# Patient Record
Sex: FEMALE | Race: WHITE | NOT HISPANIC OR LATINO | Employment: FULL TIME | URBAN - METROPOLITAN AREA
[De-identification: names, ages, dates, MRNs, and addresses within clinical notes are randomized per-mention and may not be internally consistent; named-entity substitution may affect disease eponyms.]

---

## 2017-11-01 ENCOUNTER — OFFICE VISIT (OUTPATIENT)
Dept: INTERNAL MEDICINE | Facility: MEDICAL CENTER | Age: 27
End: 2017-11-01
Payer: MEDICAID

## 2017-11-01 ENCOUNTER — HOSPITAL ENCOUNTER (OUTPATIENT)
Dept: LAB | Facility: MEDICAL CENTER | Age: 27
End: 2017-11-01
Attending: INTERNAL MEDICINE
Payer: MEDICAID

## 2017-11-01 VITALS
SYSTOLIC BLOOD PRESSURE: 144 MMHG | HEIGHT: 67 IN | RESPIRATION RATE: 12 BRPM | DIASTOLIC BLOOD PRESSURE: 82 MMHG | BODY MASS INDEX: 23.7 KG/M2 | HEART RATE: 56 BPM | OXYGEN SATURATION: 96 % | WEIGHT: 151 LBS | TEMPERATURE: 99.1 F

## 2017-11-01 DIAGNOSIS — E16.2 HYPOGLYCEMIA WITHOUT DIAGNOSIS OF DIABETES MELLITUS: ICD-10-CM

## 2017-11-01 LAB
ALBUMIN SERPL BCP-MCNC: 4.2 G/DL (ref 3.2–4.9)
ALBUMIN/GLOB SERPL: 1.3 G/DL
ALP SERPL-CCNC: 51 U/L (ref 30–99)
ALT SERPL-CCNC: 23 U/L (ref 2–50)
ANION GAP SERPL CALC-SCNC: 9 MMOL/L (ref 0–11.9)
AST SERPL-CCNC: 21 U/L (ref 12–45)
BASOPHILS # BLD AUTO: 0.5 % (ref 0–1.8)
BASOPHILS # BLD: 0.05 K/UL (ref 0–0.12)
BILIRUB SERPL-MCNC: 0.4 MG/DL (ref 0.1–1.5)
BUN SERPL-MCNC: 10 MG/DL (ref 8–22)
CALCIUM SERPL-MCNC: 9.7 MG/DL (ref 8.5–10.5)
CHLORIDE SERPL-SCNC: 106 MMOL/L (ref 96–112)
CO2 SERPL-SCNC: 23 MMOL/L (ref 20–33)
CREAT SERPL-MCNC: 0.79 MG/DL (ref 0.5–1.4)
EOSINOPHIL # BLD AUTO: 0.06 K/UL (ref 0–0.51)
EOSINOPHIL NFR BLD: 0.6 % (ref 0–6.9)
ERYTHROCYTE [DISTWIDTH] IN BLOOD BY AUTOMATED COUNT: 41.8 FL (ref 35.9–50)
EST. AVERAGE GLUCOSE BLD GHB EST-MCNC: 100 MG/DL
GFR SERPL CREATININE-BSD FRML MDRD: >60 ML/MIN/1.73 M 2
GLOBULIN SER CALC-MCNC: 3.3 G/DL (ref 1.9–3.5)
GLUCOSE 1H P CHAL SERPL-MCNC: 113 MG/DL (ref 65–199)
GLUCOSE 2H P CHAL SERPL-MCNC: 88 MG/DL (ref 65–139)
GLUCOSE 3H P CHAL SERPL-MCNC: 73 MG/DL (ref 65–109)
GLUCOSE BS SERPL-MCNC: 73 MG/DL (ref 65–99)
GLUCOSE SERPL-MCNC: 77 MG/DL (ref 65–99)
HBA1C MFR BLD: 5.1 % (ref 0–5.6)
HCT VFR BLD AUTO: 42.2 % (ref 37–47)
HGB BLD-MCNC: 14.5 G/DL (ref 12–16)
IMM GRANULOCYTES # BLD AUTO: 0.04 K/UL (ref 0–0.11)
IMM GRANULOCYTES NFR BLD AUTO: 0.4 % (ref 0–0.9)
LYMPHOCYTES # BLD AUTO: 2.28 K/UL (ref 1–4.8)
LYMPHOCYTES NFR BLD: 22.8 % (ref 22–41)
MCH RBC QN AUTO: 31.6 PG (ref 27–33)
MCHC RBC AUTO-ENTMCNC: 34.4 G/DL (ref 33.6–35)
MCV RBC AUTO: 91.9 FL (ref 81.4–97.8)
MONOCYTES # BLD AUTO: 0.57 K/UL (ref 0–0.85)
MONOCYTES NFR BLD AUTO: 5.7 % (ref 0–13.4)
NEUTROPHILS # BLD AUTO: 6.98 K/UL (ref 2–7.15)
NEUTROPHILS NFR BLD: 70 % (ref 44–72)
NRBC # BLD AUTO: 0 K/UL
NRBC BLD AUTO-RTO: 0 /100 WBC
PLATELET # BLD AUTO: 357 K/UL (ref 164–446)
PMV BLD AUTO: 9.4 FL (ref 9–12.9)
POTASSIUM SERPL-SCNC: 3.6 MMOL/L (ref 3.6–5.5)
PROT SERPL-MCNC: 7.5 G/DL (ref 6–8.2)
RBC # BLD AUTO: 4.59 M/UL (ref 4.2–5.4)
SODIUM SERPL-SCNC: 138 MMOL/L (ref 135–145)
TSH SERPL DL<=0.005 MIU/L-ACNC: 2.5 UIU/ML (ref 0.3–3.7)
WBC # BLD AUTO: 10 K/UL (ref 4.8–10.8)

## 2017-11-01 PROCEDURE — 82952 GTT-ADDED SAMPLES: CPT

## 2017-11-01 PROCEDURE — 82951 GLUCOSE TOLERANCE TEST (GTT): CPT

## 2017-11-01 PROCEDURE — 80053 COMPREHEN METABOLIC PANEL: CPT

## 2017-11-01 PROCEDURE — 99204 OFFICE O/P NEW MOD 45 MIN: CPT | Mod: GC | Performed by: INTERNAL MEDICINE

## 2017-11-01 PROCEDURE — 85025 COMPLETE CBC W/AUTO DIFF WBC: CPT

## 2017-11-01 PROCEDURE — 83036 HEMOGLOBIN GLYCOSYLATED A1C: CPT

## 2017-11-01 PROCEDURE — 36415 COLL VENOUS BLD VENIPUNCTURE: CPT

## 2017-11-01 PROCEDURE — 84443 ASSAY THYROID STIM HORMONE: CPT

## 2017-11-01 ASSESSMENT — PAIN SCALES - GENERAL: PAINLEVEL: NO PAIN

## 2017-11-01 NOTE — PROGRESS NOTES
New Patient to Establish    Reason to establish: New patient to establish    CC: Dizziness, weakness after eating x 1 year     HPI: Ms Deshpande is a 25 yo female with no significant past medical history presents today to establish a new patient and above complain. She never had a primary care doctor before. She started having these symptoms one year ago. After 1-2 hours eating breakfast she would start feeling nauseous, feels hungry, dizzy, about to pass out, and shaky, she would sit down and eat something and the symptoms go away. This happens again after eating lunch. After dinner around 7 pm she usually lies down in bed and does not experience any symptoms. Never woke up at night with the  symptoms.   It happens every day and getting worse since last 3-4 months. No previous history of diabetes, thyroid disorders. Not on any medications, OTC supplements except birth control pills. Family history of DM in mother and all 4 grandparents. Mother had ovarian cancer, no other cancers in family. Non smoker, very occasional alcohol use, no drug use.     Patient Active Problem List    Diagnosis Date Noted   • Hypoglycemia without diagnosis of diabetes mellitus 11/01/2017       Past Medical History:   Diagnosis Date   • Headache, classical migraine 2006       No current outpatient prescriptions on file.     No current facility-administered medications for this visit.        Allergies as of 11/01/2017   • (No Known Allergies)       Social History     Social History   • Marital status: Single     Spouse name: N/A   • Number of children: N/A   • Years of education: N/A     Occupational History   • Not on file.     Social History Main Topics   • Smoking status: Never Smoker   • Smokeless tobacco: Never Used   • Alcohol use No      Comment: very occasional    • Drug use: No   • Sexual activity: Yes     Birth control/ protection: Pill     Other Topics Concern   • Not on file     Social History Narrative   • No narrative on file  "      Family History   Problem Relation Age of Onset   • Cancer Mother 30     ovarian    • Diabetes Mother    • Diabetes Maternal Grandfather    • Diabetes Paternal Grandmother    • Diabetes Maternal Grandmother    • Diabetes Paternal Grandfather        History reviewed. No pertinent surgical history.    ROS: As per HPI. Additional pertinent symptoms as noted below.    Constitutional: no gain or loss of weight  Eyes: no vision changes  ENT: no neck lumps, difficulty swallowing  Cardiovascular: no chest pain palpitations  Respiratory: no sob  GI: no loss of appetite  : no urinary symptoms   Musculo-skeletal: no joint pains, has a fine tremor all the time.   Skin: no skin rashes  Neurological: no headaches, loss of consciousness  Psychological: no depression     /82   Pulse (!) 56   Temp 37.3 °C (99.1 °F)   Resp 12   Ht 1.702 m (5' 7\")   Wt 68.5 kg (151 lb)   SpO2 96%   Breastfeeding? No   BMI 23.65 kg/m²     Physical Exam  General:  Alert and oriented, No apparent distress.    Eyes: Pupils equal and reactive. No scleral icterus.    Throat: Clear no erythema or exudates noted.    Neck: Supple. No lymphadenopathy noted. Thyroid not enlarged.    Lungs: Clear to auscultation and percussion bilaterally.    Cardiovascular: Regular rate and rhythm. No murmurs, rubs or gallops.    Abdomen:  Benign. No rebound or guarding noted.    Extremities: No clubbing, cyanosis, edema, very fine tremor of both hands.     Skin: Clear. No rash or suspicious skin lesions noted.    Note: I have reviewed all pertinent labs and diagnostic tests associated with this visit with specific comments listed under the assessment and plan below    Assessment and Plan    1. Hypoglycemia without diagnosis of diabetes mellitus  Her symptoms are consistent with hypoglycemia. Need to establish low blood sugar at the time of symptoms. Will order fasting blood sugar, blood sugar after one, two and three hours after a meal. Patient is fasting " currently and will go to the lab after this appointment.   Will also order hBA1C, TSH with T4, CBC, CMP and AM cortisol to screen for Yeison's disease.   If hypoglycemia is established with lab testing, will need to do post prandial mixed meal testing for blood glucose, insulin, c peptide, BHOB, proinsulin and sulfonylurea and meglitinide screen.         Followup: Return in about 5 weeks (around 12/6/2017). for follow up      Signed by: Chasidy Palacios M.D.

## 2017-11-03 DIAGNOSIS — E16.2 HYPOGLYCEMIA WITHOUT DIAGNOSIS OF DIABETES MELLITUS: ICD-10-CM

## 2017-11-11 ENCOUNTER — HOSPITAL ENCOUNTER (OUTPATIENT)
Dept: LAB | Facility: MEDICAL CENTER | Age: 27
End: 2017-11-11
Attending: INTERNAL MEDICINE
Payer: MEDICAID

## 2017-11-11 LAB — CORTIS SERPL-MCNC: 22.1 UG/DL (ref 0–23)

## 2017-11-11 PROCEDURE — 36415 COLL VENOUS BLD VENIPUNCTURE: CPT

## 2017-11-11 PROCEDURE — 82533 TOTAL CORTISOL: CPT

## 2017-12-06 ENCOUNTER — OFFICE VISIT (OUTPATIENT)
Dept: INTERNAL MEDICINE | Facility: MEDICAL CENTER | Age: 27
End: 2017-12-06
Payer: MEDICAID

## 2017-12-06 ENCOUNTER — PATIENT MESSAGE (OUTPATIENT)
Dept: INTERNAL MEDICINE | Facility: MEDICAL CENTER | Age: 27
End: 2017-12-06

## 2017-12-06 VITALS
OXYGEN SATURATION: 94 % | TEMPERATURE: 98.1 F | RESPIRATION RATE: 16 BRPM | WEIGHT: 157 LBS | BODY MASS INDEX: 24.64 KG/M2 | DIASTOLIC BLOOD PRESSURE: 70 MMHG | HEART RATE: 82 BPM | SYSTOLIC BLOOD PRESSURE: 132 MMHG | HEIGHT: 67 IN

## 2017-12-06 DIAGNOSIS — Z00.00 HEALTH CARE MAINTENANCE: ICD-10-CM

## 2017-12-06 DIAGNOSIS — R10.13 EPIGASTRIC PAIN: ICD-10-CM

## 2017-12-06 PROCEDURE — 99213 OFFICE O/P EST LOW 20 MIN: CPT | Mod: GE | Performed by: INTERNAL MEDICINE

## 2017-12-06 RX ORDER — OMEPRAZOLE 20 MG/1
20 CAPSULE, DELAYED RELEASE ORAL DAILY
Qty: 45 CAP | Refills: 0 | Status: SHIPPED | OUTPATIENT
Start: 2017-12-06 | End: 2017-12-11

## 2017-12-06 ASSESSMENT — PATIENT HEALTH QUESTIONNAIRE - PHQ9: CLINICAL INTERPRETATION OF PHQ2 SCORE: 0

## 2017-12-06 NOTE — PROGRESS NOTES
"      Established Patient    Luba presents today with the following:    CC: follow up for epigastric pain, dizziness after eating     HPI: 27 yo female here for follow up. During first visit she complained of  feeling nauseous, feels hungry, dizzy, about to pass out, and shaky, she would sit down and eat something and the symptoms go away. This happens after eating breakfast and lunch. After dinner around 7 pm she usually lies down in bed and does not experience any symptoms. Never woke up at night with the  symptoms.   It happens every day and getting worse since last 3-4 months. Today she states that she also has burning sensation of the stomach also. Never had reflux, night time cough, metalic taste in mouth. She also noted 6 lb weight gain over 5 weeks.     Patient Active Problem List    Diagnosis Date Noted   • Health care maintenance 12/06/2017   • Epigastric pain 11/01/2017       Current Outpatient Prescriptions   Medication Sig Dispense Refill   • omeprazole (PRILOSEC) 20 MG delayed-release capsule Take 1 Cap by mouth every day. 45 Cap 0     No current facility-administered medications for this visit.        ROS: As per HPI. Additional pertinent symptoms as noted below.      /70   Pulse 82   Temp 36.7 °C (98.1 °F)   Resp 16   Ht 1.702 m (5' 7\")   Wt 71.2 kg (157 lb)   SpO2 94%   BMI 24.59 kg/m²     Physical Exam   Constitutional:  oriented to person, place, and time. No distress.   Eyes: Pupils are equal, round, and reactive to light. No scleral icterus.  Neck: Neck supple. No thyromegaly present.   Cardiovascular: Normal rate, regular rhythm and normal heart sounds.  Exam reveals no gallop and no friction rub.  No murmur heard.  Pulmonary/Chest: Breath sounds normal. Chest wall is not dull to percussion.   Musculoskeletal:   no edema.   Lymphadenopathy: no cervical adenopathy  Neurological: alert and oriented to person, place, and time.   Skin: No cyanosis. Nails show no clubbing.      Note: " I have reviewed all pertinent labs and diagnostic tests associated with this visit with specific comments listed under the assessment and plan below    Assessment and Plan    1. Epigastric pain  Given her symptoms of postprandial, weakness, dizziness and shakiness, clinically suspected post prandial hypoglycemia but lab work was negative. Today she describes she has burning sensation also. This can be related to peptic ulcer/GERD. Will do a trial of omeprazole for 4-6 weeks and see the response. Discussed about lifestyle modifications ex: avoid caffeine, spicy food,manage stress    2. Health care maintenance  Patient will schedule pap smear with OBGYN. She declines flu shot    3. Concerns for high blood pressure   She took her BP at grocery store few times and there were 3-4 times BP was 140s/90s. Recommended to keep a regular blood pressure log. Discussed about lifestyle modifications.       Followup: Return in about 5 weeks (around 1/10/2018) for Long.      Signed by: Chasidy Palacios M.D.

## 2017-12-07 NOTE — TELEPHONE ENCOUNTER
From: Luba Deshpande  To: Chasidy Palacios M.D.  Sent: 12/6/2017 5:07 PM PST  Subject: Prescription Question    The pharmacy is saying I need to get a prior authorization so that I can  my prescription?

## 2017-12-11 ENCOUNTER — TELEPHONE (OUTPATIENT)
Dept: INTERNAL MEDICINE | Facility: MEDICAL CENTER | Age: 27
End: 2017-12-11

## 2017-12-11 NOTE — TELEPHONE ENCOUNTER
Omeprazole is not covered on insurance, please change to BRAND THE[facility]A Nexium if you're comfortable, it's what's covered.

## 2018-01-08 ENCOUNTER — OFFICE VISIT (OUTPATIENT)
Dept: INTERNAL MEDICINE | Facility: MEDICAL CENTER | Age: 28
End: 2018-01-08
Payer: MEDICAID

## 2018-01-08 VITALS
SYSTOLIC BLOOD PRESSURE: 128 MMHG | HEIGHT: 67 IN | BODY MASS INDEX: 25.74 KG/M2 | DIASTOLIC BLOOD PRESSURE: 74 MMHG | OXYGEN SATURATION: 96 % | WEIGHT: 164 LBS | TEMPERATURE: 98.1 F | HEART RATE: 98 BPM | RESPIRATION RATE: 20 BRPM

## 2018-01-08 DIAGNOSIS — R10.13 EPIGASTRIC PAIN: ICD-10-CM

## 2018-01-08 DIAGNOSIS — Z00.00 HEALTH CARE MAINTENANCE: ICD-10-CM

## 2018-01-08 PROCEDURE — 99213 OFFICE O/P EST LOW 20 MIN: CPT | Mod: GC | Performed by: INTERNAL MEDICINE

## 2018-01-08 RX ORDER — DROSPIRENONE AND ETHINYL ESTRADIOL 0.03MG-3MG
1 KIT ORAL DAILY
COMMUNITY
Start: 2018-01-02

## 2018-01-08 ASSESSMENT — PAIN SCALES - GENERAL: PAINLEVEL: NO PAIN

## 2018-01-08 NOTE — PROGRESS NOTES
"      Established Patient    Luba presents today with the following:    CC: follow up epigastric pain     HPI: 26 yo female presents today for follow up of epigastric  Pain. She was started on nexium 20 mg QD which helped the burning sensation but continues to have symptoms towards afternoon.worst pain in 8/10.  She has tried to cut down portion size of meals and tried to exercise more but she has gained 13 lb last 2 months. She still consumes one energy drink a day and eats chocolate. No NSAID use. Denies melena, [place] bleeding.   Noted to have high blood pressure on 2 occasions, did not measure at home. Normal BP today.     Patient Active Problem List    Diagnosis Date Noted   • Health care maintenance 12/06/2017   • Epigastric pain 11/01/2017       Current Outpatient Prescriptions   Medication Sig Dispense Refill   • esomeprazole (NEXIUM) 20 MG capsule Take 1 Cap by mouth 2 times daily, before breakfast and dinner. 60 Cap 2   • drospirenone-ethinyl estradiol (ZEUS) 3-0.03 MG per tablet Take 1 Tab by mouth every day.       No current facility-administered medications for this visit.        ROS: As per HPI. Additional pertinent symptoms as noted below.        /74   Pulse 98   Temp 36.7 °C (98.1 °F)   Resp 20   Ht 1.702 m (5' 7\")   Wt 74.4 kg (164 lb)   SpO2 96%   Breastfeeding? No   BMI 25.69 kg/m²     Physical Exam   Constitutional:  oriented to person, place, and time. No distress.   Eyes: Pupils are equal, round, and reactive to light. No scleral icterus.  Neck: Neck supple. No thyromegaly present.   Cardiovascular: Normal rate, regular rhythm and normal heart sounds.  Exam reveals no gallop and no friction rub.  No murmur heard.  Pulmonary/Chest: Breath sounds normal. Chest wall is not dull to percussion.   No epigastric tenderness.  Musculoskeletal:   no edema.   Lymphadenopathy: no cervical adenopathy  Neurological: alert and oriented to person, place, and time.   Skin: No cyanosis. Nails show " no clubbing.      Note: I have reviewed all pertinent labs and diagnostic tests associated with this visit with specific comments listed under the assessment and plan below    Assessment and Plan    1. Epigastric pain  Her symptoms improve with meals, responded to PPI, gained 13 lb last 2 months. Possible duodenal ulcer. Will increase nexium to 20 mg twice a day. Consider to add carafet if no improvement.   Referral sent to GI.   Discussed about healthy diet options, avoiding caffeine, chocolate. She is already a non smoker, no alcohol use.     2. Health care maintenance  Pap - done by OBGYN  Flu - deferred       Followup: Return in about 10 weeks (around 3/19/2018) for Long.      Signed by: Chasidy Palacios M.D.

## 2018-01-08 NOTE — PATIENT INSTRUCTIONS
Increase nexium to twice a day  A referral sent to gastroenterology    Food Choices for Gastroesophageal Reflux Disease, Adult  When you have gastroesophageal reflux disease (GERD), the foods you eat and your eating habits are very important. Choosing the right foods can help ease the discomfort of GERD.  WHAT GENERAL GUIDELINES DO I NEED TO FOLLOW?  · Choose fruits, vegetables, whole grains, low-fat dairy products, and low-fat meat, fish, and poultry.  · Limit fats such as oils, salad dressings, butter, nuts, and avocado.  · Keep a food diary to identify foods that cause symptoms.  · Avoid foods that cause reflux. These may be different for different people.  · Eat frequent small meals instead of three large meals each day.  · Eat your meals slowly, in a relaxed setting.  · Limit fried foods.  · Cook foods using methods other than frying.  · Avoid drinking alcohol.  · Avoid drinking large amounts of liquids with your meals.  · Avoid bending over or lying down until 2-3 hours after eating.  WHAT FOODS ARE NOT RECOMMENDED?  The following are some foods and drinks that may worsen your symptoms:  Vegetables  Tomatoes. Tomato juice. Tomato and spaghetti sauce. Chili peppers. Onion and garlic. Horseradish.  Fruits  Oranges, grapefruit, and lemon (fruit and juice).  Meats  High-fat meats, fish, and poultry. This includes hot dogs, ribs, ham, sausage, salami, and malik.  Dairy  Whole milk and chocolate milk. Sour cream. Cream. Butter. Ice cream. Cream cheese.   Beverages  Coffee and tea, with or without caffeine. Carbonated beverages or energy drinks.  Condiments  Hot sauce. Barbecue sauce.   Sweets/Desserts  Chocolate and cocoa. Donuts. Peppermint and spearmint.  Fats and Oils  High-fat foods, including French fries and potato chips.  Other  Vinegar. Strong spices, such as black pepper, white pepper, red pepper, cayenne, arthur powder, cloves, ginger, and chili powder.  The items listed above may not be a complete list of  foods and beverages to avoid. Contact your dietitian for more information.     This information is not intended to replace advice given to you by your health care provider. Make sure you discuss any questions you have with your health care provider.     Document Released: 12/18/2006 Document Revised: 01/08/2016 Document Reviewed: 10/22/2014  [employer] Interactive Patient Education ©2016 [employer]

## 2018-02-12 ENCOUNTER — TELEPHONE (OUTPATIENT)
Dept: INTERNAL MEDICINE | Facility: MEDICAL CENTER | Age: 28
End: 2018-02-12

## 2018-03-07 DIAGNOSIS — K62.5 PR (BLEEDING PER RECTUM): ICD-10-CM

## 2018-03-19 ENCOUNTER — APPOINTMENT (OUTPATIENT)
Dept: INTERNAL MEDICINE | Facility: MEDICAL CENTER | Age: 28
End: 2018-03-19
Payer: MEDICAID

## 2018-03-21 ENCOUNTER — OFFICE VISIT (OUTPATIENT)
Dept: INTERNAL MEDICINE | Facility: MEDICAL CENTER | Age: 28
End: 2018-03-21
Payer: MEDICAID

## 2018-03-21 VITALS
WEIGHT: 153 LBS | TEMPERATURE: 98.7 F | HEIGHT: 67 IN | HEART RATE: 69 BPM | DIASTOLIC BLOOD PRESSURE: 78 MMHG | BODY MASS INDEX: 24.01 KG/M2 | SYSTOLIC BLOOD PRESSURE: 98 MMHG | OXYGEN SATURATION: 98 %

## 2018-03-21 DIAGNOSIS — K62.5 PR (BLEEDING PER RECTUM): ICD-10-CM

## 2018-03-21 DIAGNOSIS — R10.13 EPIGASTRIC PAIN: ICD-10-CM

## 2018-03-21 PROCEDURE — 99214 OFFICE O/P EST MOD 30 MIN: CPT | Mod: GC | Performed by: INTERNAL MEDICINE

## 2018-03-21 NOTE — PROGRESS NOTES
"      Established Patient    Luba presents today with the following:    CC: Epigastric pain     HPI: Luba presents today for follow up. Her epigastric pain is somewhat controlled with Nexium. Compliant with medications and dietary modifications. She had been more active and lost some weight intentionally. She recently had one episode of bleeding MT, evaluated in ED. Denies any more episodes of MT bleeding, constipation, diarrhea, abdominal pain, melena, hematemesis.   She is waiting for an appointment with GI.     Patient Active Problem List    Diagnosis Date Noted   • MT (bleeding per rectum) 03/21/2018   • Health care maintenance 12/06/2017   • Epigastric pain 11/01/2017       Current Outpatient Prescriptions   Medication Sig Dispense Refill   • esomeprazole (NEXIUM) 20 MG capsule Take 1 Cap by mouth every morning before breakfast. 30 Cap 1   • drospirenone-ethinyl estradiol (ZEUS) 3-0.03 MG per tablet Take 1 Tab by mouth every day.       No current facility-administered medications for this visit.        ROS: As per HPI. Additional pertinent symptoms as noted below.        BP (!) 98/78   Pulse 69   Temp 37.1 °C (98.7 °F)   Ht 1.702 m (5' 7\")   Wt 69.4 kg (153 lb)   LMP 03/01/2018   SpO2 98%   BMI 23.96 kg/m²     Physical Exam   Constitutional:  oriented to person, place, and time. No distress.   Eyes: Pupils are equal, round, and reactive to light. No scleral icterus.  Neck: Neck supple. No thyromegaly present.   Cardiovascular: Normal rate, regular rhythm and normal heart sounds.  Exam reveals no gallop and no friction rub.  No murmur heard.  Pulmonary/Chest: Breath sounds normal. Chest wall is not dull to percussion.   Musculoskeletal:   no edema.   Lymphadenopathy: no cervical adenopathy  Neurological: alert and oriented to person, place, and time.   Skin: No cyanosis. Nails show no clubbing.      Note: I have reviewed all pertinent labs and diagnostic tests associated with this visit with " specific comments listed under the assessment and plan below    Assessment and Plan    1. Epigastric pain  Improved symptoms with nexium.   Continue PPI until seen by GI.    2. CT (bleeding per rectum)  One episode, no recurrences. No family history of colon cancer.   No constipation. Discussed dietary changes to avoid constipation.   Awaiting GI evaluation.       Followup: Return in about 6 months (around 9/21/2018) for Long.      Signed by: Chasidy Palacios M.D.

## 2019-05-07 ENCOUNTER — APPOINTMENT (OUTPATIENT)
Dept: RADIOLOGY | Facility: IMAGING CENTER | Age: 29
End: 2019-05-07
Attending: PHYSICIAN ASSISTANT

## 2019-05-07 ENCOUNTER — OFFICE VISIT (OUTPATIENT)
Dept: URGENT CARE | Facility: CLINIC | Age: 29
End: 2019-05-07

## 2019-05-07 VITALS
OXYGEN SATURATION: 97 % | HEIGHT: 67 IN | WEIGHT: 153 LBS | RESPIRATION RATE: 13 BRPM | SYSTOLIC BLOOD PRESSURE: 114 MMHG | BODY MASS INDEX: 24.01 KG/M2 | TEMPERATURE: 97.7 F | DIASTOLIC BLOOD PRESSURE: 76 MMHG | HEART RATE: 62 BPM

## 2019-05-07 DIAGNOSIS — M79.672 LEFT FOOT PAIN: ICD-10-CM

## 2019-05-07 PROCEDURE — 99213 OFFICE O/P EST LOW 20 MIN: CPT | Performed by: PHYSICIAN ASSISTANT

## 2019-05-07 PROCEDURE — 73610 X-RAY EXAM OF ANKLE: CPT | Mod: TC,LT | Performed by: PHYSICIAN ASSISTANT

## 2019-05-07 PROCEDURE — 73630 X-RAY EXAM OF FOOT: CPT | Mod: TC,LT | Performed by: PHYSICIAN ASSISTANT

## 2019-05-07 ASSESSMENT — ENCOUNTER SYMPTOMS
SENSORY CHANGE: 0
TINGLING: 1
FOCAL WEAKNESS: 0

## 2019-05-08 NOTE — PROGRESS NOTES
"  Subjective:   Luba Deshpande is a 28 y.o. female who presents today with   Chief Complaint   Patient presents with   • Foot Pain       Foot Problem   This is a new problem. The current episode started in the past 7 days. The problem occurs constantly. The problem has been gradually worsening. The symptoms are aggravated by standing, twisting and walking. She has tried acetaminophen and ice for the symptoms. The treatment provided mild relief.   Patient states she was doing roller Tram several days ago and she fell hurting her left ankle and foot.    PMH:  has a past medical history of Headache, classical migraine (2006). She also has no past medical history of Diabetes ([facility]); Seizure ([facility]); or Thyroid disease.  MEDS:   Current Outpatient Prescriptions:   •  SPRINTEC 28 0.25-35 MG-MCG per tablet, , Disp: , Rfl: 11  •  drospirenone-ethinyl estradiol (ZEUS) 3-0.03 MG per tablet, Take 1 Tab by mouth every day., Disp: , Rfl:   •  esomeprazole (NEXIUM) 20 MG capsule, Take 1 Cap by mouth every morning before breakfast. (Patient not taking: Reported on 5/7/2019), Disp: 30 Cap, Rfl: 1  ALLERGIES: No Known Allergies  SURGHX: History reviewed. No pertinent surgical history.  SOCHX:  reports that she has never smoked. She has never used smokeless tobacco. She reports that she does not drink alcohol or use drugs.  FH: Reviewed with patient, not pertinent to this visit.       Review of Systems   Musculoskeletal:        Left ankle and foot pain   Neurological: Positive for tingling. Negative for sensory change and focal weakness.   All other systems reviewed and are negative.       Objective:   /76   Pulse 62   Temp 36.5 °C (97.7 °F) (Temporal)   Resp 13   Ht 1.702 m (5' 7\")   Wt 69.4 kg (153 lb)   SpO2 97%   BMI 23.96 kg/m²   Physical Exam   Constitutional: She appears well-developed and well-nourished. No distress.   HENT:   Head: Normocephalic and atraumatic.   Right Ear: Hearing normal.   Left Ear: " Hearing normal.   Eyes: Pupils are equal, round, and reactive to light.   Cardiovascular: Normal rate, regular rhythm and normal heart sounds.    Pulmonary/Chest: Effort normal and breath sounds normal.   Musculoskeletal:        Left ankle: She exhibits decreased range of motion and swelling. Tenderness.        Left foot: There is decreased range of motion, tenderness, bony tenderness and swelling.        Feet:    Patient has decreased range of motion in her left ankle, foot, and toes.   Neurological: She is alert. Coordination normal.   Skin: Skin is warm and dry.   Psychiatric: She has a normal mood and affect.   Nursing note and vitals reviewed.    DX FOOT  MINERALIZATION: Mineralization is unremarkable for age.    INJURY: No acute fracture or gross malalignment is seen.    JOINTS: No erosive arthropathy is evident.    DX ANKLE  BONE MINERALIZATION: Normal.  JOINTS: Preserved. No erosions.  FRACTURE: None.  DISLOCATION: None.  SOFT TISSUES: No mass.  Assessment/Plan:   Assessment    1. Left foot pain  - DX-ANKLE 3+ VIEWS LEFT; Future  - DX-FOOT-COMPLETE 3+ LEFT; Future  - REFERRAL TO ORTHOPEDICS    Other orders  - SPRINTEC 28 0.25-35 MG-MCG per tablet; ; Refill: 11  Discussed x-ray findings with patient.  Encourage patient to rest, ice, compress and elevate the left ankle and foot.  Patient was given a boot to use over the course of the next week.  Encourage patient to follow-up with orthopedic appointment if her pain persists.    Differential diagnosis, natural history, supportive care, and indications for immediate follow-up discussed.   Patient given instructions and understanding of medications and treatment.    If not improving in 3-5 days, F/U with PCP or return to UC if symptoms worsen.  Strict ER precautions given.  Patient agreeable to plan.      Stephan Goldberg PA-C

## 2019-07-28 ENCOUNTER — HOSPITAL ENCOUNTER (EMERGENCY)
Facility: MEDICAL CENTER | Age: 29
End: 2019-07-28
Attending: EMERGENCY MEDICINE

## 2019-07-28 VITALS
RESPIRATION RATE: 18 BRPM | DIASTOLIC BLOOD PRESSURE: 96 MMHG | HEART RATE: 84 BPM | OXYGEN SATURATION: 98 % | WEIGHT: 149.91 LBS | SYSTOLIC BLOOD PRESSURE: 147 MMHG | BODY MASS INDEX: 23.48 KG/M2 | TEMPERATURE: 98.8 F

## 2019-07-28 DIAGNOSIS — L02.91 ABSCESS: ICD-10-CM

## 2019-07-28 PROCEDURE — 303977 HCHG I & D

## 2019-07-28 PROCEDURE — 700101 HCHG RX REV CODE 250: Performed by: EMERGENCY MEDICINE

## 2019-07-28 PROCEDURE — 99283 EMERGENCY DEPT VISIT LOW MDM: CPT

## 2019-07-28 PROCEDURE — 700111 HCHG RX REV CODE 636 W/ 250 OVERRIDE (IP): Performed by: EMERGENCY MEDICINE

## 2019-07-28 RX ORDER — CEPHALEXIN 500 MG/1
500 CAPSULE ORAL 4 TIMES DAILY
Qty: 28 CAP | Refills: 0 | Status: SHIPPED | OUTPATIENT
Start: 2019-07-28 | End: 2019-08-04

## 2019-07-28 RX ORDER — ONDANSETRON 4 MG/1
4 TABLET, ORALLY DISINTEGRATING ORAL ONCE
Status: COMPLETED | OUTPATIENT
Start: 2019-07-28 | End: 2019-07-28

## 2019-07-28 RX ORDER — LIDOCAINE HYDROCHLORIDE 10 MG/ML
20 INJECTION, SOLUTION INFILTRATION; PERINEURAL ONCE
Status: COMPLETED | OUTPATIENT
Start: 2019-07-28 | End: 2019-07-28

## 2019-07-28 RX ORDER — ONDANSETRON 4 MG/1
4 TABLET, ORALLY DISINTEGRATING ORAL EVERY 6 HOURS PRN
Qty: 6 TAB | Refills: 0 | Status: SHIPPED | OUTPATIENT
Start: 2019-07-28 | End: 2019-07-30

## 2019-07-28 RX ORDER — SULFAMETHOXAZOLE AND TRIMETHOPRIM 800; 160 MG/1; MG/1
1 TABLET ORAL 2 TIMES DAILY
Qty: 14 TAB | Refills: 0 | Status: SHIPPED | OUTPATIENT
Start: 2019-07-28 | End: 2019-08-04

## 2019-07-28 RX ADMIN — ONDANSETRON 4 MG: 4 TABLET, ORALLY DISINTEGRATING ORAL at 12:07

## 2019-07-28 RX ADMIN — LIDOCAINE HYDROCHLORIDE 20 ML: 10 INJECTION, SOLUTION INFILTRATION; PERINEURAL at 12:07

## 2019-07-28 NOTE — ED PROVIDER NOTES
ER Provider Note    Scribed for Brina Tucker M.D. by Maggi Aguilar. 7/28/2019  12:09 PM    CHIEF COMPLAINT  Chief Complaint   Patient presents with   • Abscess     Pt reports abscess in axillary area for the past 3 days, woke today with nausea/chills    • Nausea   • Chills       HPI  Luba Deshpande is a 28 y.o. female who presents with tender swollen area on the left axillary. This started a 3 days ago. There is associated redness. There is significant pain. Pain is nonradiating from the area. Pain is described as throbbing. She notes associated nausea. The patient adds that she does not have a history of abscesses. She irritated the affected area and it has worsened in severity.      REVIEW OF SYSTEMS  See HPI    PAST MEDICAL HISTORY  Past Medical History:   Diagnosis Date   • Headache, classical migraine 2006       SOCIAL HISTORY  Social History     Social History   • Marital status: Single     Spouse name: N/A   • Number of children: N/A   • Years of education: N/A     Social History Main Topics   • Smoking status: Never Smoker   • Smokeless tobacco: Never Used   • Alcohol use No      Comment: very occasional    • Drug use: No   • Sexual activity: Yes     Birth control/ protection: Pill                   SURGICAL HISTORY  History reviewed. No pertinent surgical history.    CURRENT MEDICATIONS  No current facility-administered medications on file prior to encounter.      Current Outpatient Prescriptions on File Prior to Encounter   Medication Sig Dispense Refill   • SPRINTEC 28 0.25-35 MG-MCG per tablet   11   • esomeprazole (NEXIUM) 20 MG capsule Take 1 Cap by mouth every morning before breakfast. (Patient not taking: Reported on 5/7/2019) 30 Cap 1   • drospirenone-ethinyl estradiol (ZEUS) 3-0.03 MG per tablet Take 1 Tab by mouth every day.           ALLERGIES  No Known Allergies    PHYSICAL EXAM  VITAL SIGNS: /96   Pulse 84   Temp 37.1 °C (98.8 °F) (Temporal)   Resp 18   Wt 68 kg (149  lb 14.6 oz)   SpO2 98%   BMI 23.48 kg/m²   Constitutional: Well developed, Well nourished, No acute distress  HENT:  Atraumatic, Normocephalic  Eyes: sclera white, No discharge.   Neck: Normal range of motion  Lymphatic: No lymphadenopathy noted.   Cardiovascular: Normal heart rate  Thorax & Lungs: No respiratory distress  Skin: Left axilla has a half dollar sized area of swelling tenderness with fluctuance and erythema, No red streaking,  Extremity: Normal range of motion of left shoulder, no shoulder pain, neurovascular intact  Psychiatric: Affect normal      Incision and Drainage Procedure Note    Indication: Abscess    Procedure: The patient was positioned appropriately and the skin over the incision site was prepped with betadine and draped in a sterile fashion. Local anesthesia was obtained by infiltration using 3cc of 1% Lidocaine without epinephrine.  An incision was then made over the apex of the lesion then foul smelling and purulent material was expressed. Loculations were broken up using a hemostat and more of the material was able to be expressed. The drainage cavity was then  packed with sterile gauze.   The patient tolerated the procedure well.    Complications: None    COURSE & MEDICAL DECISION MAKING    12:00 PM - The patient was seen at bedside and we discussed the plan of care that includes local anesthesia and incision and drainage (see note above). I explained the risks and benefits of the procedure and patient verbalizes understanding and consents.     12:03 PM - Ordered lidocaine 1% injection and Zofran ODT 4mg.     12:11 PM - Performed incision and drainage procedure as above.     Abscess drained. Patient nontoxic. Patient should do well with outpatient treatment. Patient instructed to change dressing daily, keep clean and allow drainage. Patient will be treated with bactrim and keflex. Patient should return to the emergency department in 2-3 days for recheck or follow up with primary  physician. Patient should return sooner for high fevers, expanding redness, worsening, not improving or concern.    FINAL IMPRESSION  1. subcutaneous abscess  2. Incision and drainage of abscess    Disposition: Home    Please note that this dictation was created using voice recognition software. I have worked with consultants from the vendor as well as technical experts from [facility] to optimize the interface. I have made every reasonable attempt to correct obvious errors, but I expect that there are errors of grammar and possibly content that I did not discover before finalizing the note. E     Maggi FERNANDES (Esther), am scribing for, and in the presence of, Brina Tucker M.D..    Electronically signed by: Maggi Aguilar (Esther), 7/28/2019    Brina FERNANDES M.D. personally performed the services described in this documentation, as scribed by Maggi Aguilar in my presence, and it is both accurate and complete.    The note accurately reflects work and decisions made by me.  Brina Tucker  7/28/2019  12:32 PM

## 2019-07-28 NOTE — ED TRIAGE NOTES
Chief Complaint   Patient presents with   • Abscess     Pt reports abscess in axillary area for the past 3 days, woke today with nausea/chills    • Nausea   • Chills     Explained to pt triage process, made pt aware to tell this RN/staff of any changes/concerns, pt verbalized understanding of process and instructions given. Pt to ER lobby.

## 2019-07-30 ENCOUNTER — HOSPITAL ENCOUNTER (EMERGENCY)
Facility: MEDICAL CENTER | Age: 29
End: 2019-07-30
Attending: EMERGENCY MEDICINE

## 2019-07-30 VITALS
SYSTOLIC BLOOD PRESSURE: 133 MMHG | HEIGHT: 67 IN | WEIGHT: 149.03 LBS | HEART RATE: 79 BPM | BODY MASS INDEX: 23.39 KG/M2 | TEMPERATURE: 98.2 F | OXYGEN SATURATION: 95 % | RESPIRATION RATE: 16 BRPM | DIASTOLIC BLOOD PRESSURE: 95 MMHG

## 2019-07-30 DIAGNOSIS — Z51.89 ENCOUNTER FOR WOUND RE-CHECK: ICD-10-CM

## 2019-07-30 DIAGNOSIS — L02.91 ABSCESS: ICD-10-CM

## 2019-07-30 PROCEDURE — 303977 HCHG I & D

## 2019-07-30 PROCEDURE — 99283 EMERGENCY DEPT VISIT LOW MDM: CPT

## 2019-07-30 NOTE — ED PROVIDER NOTES
"ED Provider Note    CHIEF COMPLAINT  Chief Complaint   Patient presents with   • Wound Re-Check     left axilla, abscess repacked     Seen at 11:04 AM    HPI  Luba Deshpande is a 28 y.o. female who presents with a abscess left axillary region.  The patient had an incision and drainage 48 hours ago, she was sent home on antibiotics.  She notes significant improvement of the swelling and continues to have purulent drainage from the wound.  Despite this she also notes a additional lesion that either has developed since her presentation before or this is more apparent now that the other swelling has decreased.  She does not smoke, she denies any fevers or chills.  No history of impaired immunity.  The patient has never had abscesses before.    REVIEW OF SYSTEMS  See HPI  PAST MEDICAL HISTORY   has a past medical history of Headache, classical migraine (2006).    SOCIAL HISTORY  Social History     Social History Main Topics   • Smoking status: Never Smoker   • Smokeless tobacco: Never Used   • Alcohol use No      Comment: very occasional    • Drug use: No   • Sexual activity: Yes     Birth control/ protection: Pill       SURGICAL HISTORY  patient denies any surgical history    CURRENT MEDICATIONS  Reviewed.  See Encounter Summary.     ALLERGIES  No Known Allergies    PHYSICAL EXAM  VITAL SIGNS: /95   Pulse 79   Temp 36.8 °C (98.2 °F) (Temporal)   Resp 16   Ht 1.702 m (5' 7\")   Wt 67.6 kg (149 lb 0.5 oz)   SpO2 95%   BMI 23.34 kg/m²   Constitutional: Awake, alert in no apparent distress.  HENT: Normocephalic, Bilateral external ears normal. Nose normal.   Eyes: Conjunctiva normal, non-icteric, EOMI.    Thorax & Lungs: Easy unlabored respirations  Cardiovascular:    Abdomen:  No distention  Skin: Left axilla: There is a large draining abscess with purulence expressed.  The packing has already been removed.  Just medial to this there is a smaller 1 cm area of induration can test.  Extremities:   " atraumatic   Neurologic: Alert, Grossly non-focal.   Psychiatric: Affect and Mood normal    RADIOLOGY  No orders to display       Nursing notes and vital signs were reviewed. (See chart for details)    Medical record reviewed: The patient was seen here July 28 for an abscess I&D and packing.    Procedure note: The area of fluctuance was anesthetized with 1% lidocaine with epinephrine.  3 cc were used.  The other abscess was also anesthetized at this time.  The open wound was gently compressed and additional purulence was expressed, this was probed and no loculations were present.  It did not communicate with the new area of induration, therefore a second stab incision with an 11 blade was made.  Moderate purulence was expressed.  This was tolerated well and left open.    Decision Making:  This is a 28 y.o. year old female who presents with abscesses in the left axillary region.  The patient does not have any history of impaired immunity.  She has never had abscesses in the past.  I did discuss treatment with antibiotics.  I also recommend a antibiotic rinse to help curtail any possible colonization of MRSA.  No specific follow-up is required as both have been definitively managed.  If patient notes any increasing swelling, increasing pain or redness that she should return for repeat evaluation though I suspect these will now heal well.      DISPOSITION:  Patient will be discharged home in good condition.    Discharge Medications:  Discharge Medication List as of 7/30/2019 11:59 AM          The patient was discharged home (see d/c instructions) and told to return immediately for any signs or symptoms listed, or any worsening at all.  The patient verbally agreed to the discharge precautions and follow-up plan which is documented in EPIC.    She was initially tachycardic at triage but this resolved without any treatment.The patient's blood pressure is elevated today. >120/80. I have referred them to primary care for  follow up.           FINAL IMPRESSION  1. Encounter for wound re-check    2. Abscess

## 2019-07-30 NOTE — ED TRIAGE NOTES
Luba Deshpande  Chief Complaint   Patient presents with   • Wound Re-Check     left axilla, abscess repacked     Pt ambulatory to triage with above complaint.  VSS, no acute distress.   States packing to (L) axilla abscess fell out, but needed to come back for re- assessment and packing.  Pt returned to lobby, educated on triage process, and to inform staff of any changes or concerns.

## 2020-10-29 ENCOUNTER — APPOINTMENT (OUTPATIENT)
Dept: RADIOLOGY | Facility: MEDICAL CENTER | Age: 30
End: 2020-10-29
Attending: EMERGENCY MEDICINE
Payer: OTHER GOVERNMENT

## 2020-10-29 ENCOUNTER — HOSPITAL ENCOUNTER (EMERGENCY)
Facility: MEDICAL CENTER | Age: 30
End: 2020-10-29
Attending: EMERGENCY MEDICINE
Payer: OTHER GOVERNMENT

## 2020-10-29 VITALS
HEIGHT: 67 IN | BODY MASS INDEX: 21.97 KG/M2 | RESPIRATION RATE: 18 BRPM | SYSTOLIC BLOOD PRESSURE: 112 MMHG | OXYGEN SATURATION: 99 % | HEART RATE: 68 BPM | DIASTOLIC BLOOD PRESSURE: 75 MMHG | TEMPERATURE: 98 F | WEIGHT: 140 LBS

## 2020-10-29 DIAGNOSIS — R11.2 NON-INTRACTABLE VOMITING WITH NAUSEA, UNSPECIFIED VOMITING TYPE: ICD-10-CM

## 2020-10-29 LAB
ALBUMIN SERPL BCP-MCNC: 4.6 G/DL (ref 3.2–4.9)
ALBUMIN/GLOB SERPL: 1.4 G/DL
ALP SERPL-CCNC: 70 U/L (ref 30–99)
ALT SERPL-CCNC: 58 U/L (ref 2–50)
ANION GAP SERPL CALC-SCNC: 18 MMOL/L (ref 7–16)
APPEARANCE UR: CLEAR
AST SERPL-CCNC: 57 U/L (ref 12–45)
BACTERIA #/AREA URNS HPF: ABNORMAL /HPF
BASOPHILS # BLD AUTO: 0.3 % (ref 0–1.8)
BASOPHILS # BLD: 0.05 K/UL (ref 0–0.12)
BILIRUB SERPL-MCNC: 0.7 MG/DL (ref 0.1–1.5)
BILIRUB UR QL STRIP.AUTO: NEGATIVE
BUN SERPL-MCNC: 15 MG/DL (ref 8–22)
CALCIUM SERPL-MCNC: 9.6 MG/DL (ref 8.5–10.5)
CHLORIDE SERPL-SCNC: 104 MMOL/L (ref 96–112)
CO2 SERPL-SCNC: 16 MMOL/L (ref 20–33)
COLOR UR: ABNORMAL
COVID ORDER STATUS COVID19: NORMAL
CREAT SERPL-MCNC: 0.86 MG/DL (ref 0.5–1.4)
EKG IMPRESSION: NORMAL
EOSINOPHIL # BLD AUTO: 0.01 K/UL (ref 0–0.51)
EOSINOPHIL NFR BLD: 0.1 % (ref 0–6.9)
EPI CELLS #/AREA URNS HPF: ABNORMAL /HPF
ERYTHROCYTE [DISTWIDTH] IN BLOOD BY AUTOMATED COUNT: 42 FL (ref 35.9–50)
GLOBULIN SER CALC-MCNC: 3.3 G/DL (ref 1.9–3.5)
GLUCOSE SERPL-MCNC: 184 MG/DL (ref 65–99)
GLUCOSE UR STRIP.AUTO-MCNC: >=1000 MG/DL
HCG SERPL QL: NEGATIVE
HCT VFR BLD AUTO: 41.5 % (ref 37–47)
HGB BLD-MCNC: 14.6 G/DL (ref 12–16)
HYALINE CASTS #/AREA URNS LPF: ABNORMAL /LPF
IMM GRANULOCYTES # BLD AUTO: 0.07 K/UL (ref 0–0.11)
IMM GRANULOCYTES NFR BLD AUTO: 0.5 % (ref 0–0.9)
KETONES UR STRIP.AUTO-MCNC: >=160 MG/DL
LEUKOCYTE ESTERASE UR QL STRIP.AUTO: NEGATIVE
LIPASE SERPL-CCNC: 18 U/L (ref 11–82)
LYMPHOCYTES # BLD AUTO: 0.48 K/UL (ref 1–4.8)
LYMPHOCYTES NFR BLD: 3.2 % (ref 22–41)
MCH RBC QN AUTO: 31.5 PG (ref 27–33)
MCHC RBC AUTO-ENTMCNC: 35.2 G/DL (ref 33.6–35)
MCV RBC AUTO: 89.6 FL (ref 81.4–97.8)
MICRO URNS: ABNORMAL
MONOCYTES # BLD AUTO: 0.28 K/UL (ref 0–0.85)
MONOCYTES NFR BLD AUTO: 1.9 % (ref 0–13.4)
NEUTROPHILS # BLD AUTO: 14.12 K/UL (ref 2–7.15)
NEUTROPHILS NFR BLD: 94 % (ref 44–72)
NITRITE UR QL STRIP.AUTO: NEGATIVE
NRBC # BLD AUTO: 0 K/UL
NRBC BLD-RTO: 0 /100 WBC
PH UR STRIP.AUTO: 6.5 [PH] (ref 5–8)
PLATELET # BLD AUTO: 345 K/UL (ref 164–446)
PMV BLD AUTO: 9.1 FL (ref 9–12.9)
POTASSIUM SERPL-SCNC: 3.7 MMOL/L (ref 3.6–5.5)
PROT SERPL-MCNC: 7.9 G/DL (ref 6–8.2)
PROT UR QL STRIP: 100 MG/DL
RBC # BLD AUTO: 4.63 M/UL (ref 4.2–5.4)
RBC # URNS HPF: ABNORMAL /HPF
RBC UR QL AUTO: NEGATIVE
SARS-COV-2 RNA RESP QL NAA+PROBE: NOTDETECTED
SODIUM SERPL-SCNC: 138 MMOL/L (ref 135–145)
SP GR UR STRIP.AUTO: 1.04
SPECIMEN SOURCE: NORMAL
UROBILINOGEN UR STRIP.AUTO-MCNC: 0.2 MG/DL
WBC # BLD AUTO: 15 K/UL (ref 4.8–10.8)
WBC #/AREA URNS HPF: ABNORMAL /HPF

## 2020-10-29 PROCEDURE — 85025 COMPLETE CBC W/AUTO DIFF WBC: CPT

## 2020-10-29 PROCEDURE — A9270 NON-COVERED ITEM OR SERVICE: HCPCS | Performed by: EMERGENCY MEDICINE

## 2020-10-29 PROCEDURE — 84703 CHORIONIC GONADOTROPIN ASSAY: CPT

## 2020-10-29 PROCEDURE — C9803 HOPD COVID-19 SPEC COLLECT: HCPCS | Performed by: EMERGENCY MEDICINE

## 2020-10-29 PROCEDURE — 87086 URINE CULTURE/COLONY COUNT: CPT

## 2020-10-29 PROCEDURE — 83690 ASSAY OF LIPASE: CPT

## 2020-10-29 PROCEDURE — U0003 INFECTIOUS AGENT DETECTION BY NUCLEIC ACID (DNA OR RNA); SEVERE ACUTE RESPIRATORY SYNDROME CORONAVIRUS 2 (SARS-COV-2) (CORONAVIRUS DISEASE [COVID-19]), AMPLIFIED PROBE TECHNIQUE, MAKING USE OF HIGH THROUGHPUT TECHNOLOGIES AS DESCRIBED BY CMS-2020-01-R: HCPCS

## 2020-10-29 PROCEDURE — 700105 HCHG RX REV CODE 258: Performed by: EMERGENCY MEDICINE

## 2020-10-29 PROCEDURE — 80053 COMPREHEN METABOLIC PANEL: CPT

## 2020-10-29 PROCEDURE — 700102 HCHG RX REV CODE 250 W/ 637 OVERRIDE(OP): Performed by: EMERGENCY MEDICINE

## 2020-10-29 PROCEDURE — 96375 TX/PRO/DX INJ NEW DRUG ADDON: CPT

## 2020-10-29 PROCEDURE — 81001 URINALYSIS AUTO W/SCOPE: CPT

## 2020-10-29 PROCEDURE — 96376 TX/PRO/DX INJ SAME DRUG ADON: CPT

## 2020-10-29 PROCEDURE — 71045 X-RAY EXAM CHEST 1 VIEW: CPT

## 2020-10-29 PROCEDURE — 93005 ELECTROCARDIOGRAM TRACING: CPT | Performed by: EMERGENCY MEDICINE

## 2020-10-29 PROCEDURE — 96374 THER/PROPH/DIAG INJ IV PUSH: CPT

## 2020-10-29 PROCEDURE — 99285 EMERGENCY DEPT VISIT HI MDM: CPT

## 2020-10-29 PROCEDURE — 700111 HCHG RX REV CODE 636 W/ 250 OVERRIDE (IP): Performed by: EMERGENCY MEDICINE

## 2020-10-29 PROCEDURE — 76705 ECHO EXAM OF ABDOMEN: CPT

## 2020-10-29 RX ORDER — SODIUM CHLORIDE 9 MG/ML
1000 INJECTION, SOLUTION INTRAVENOUS ONCE
Status: COMPLETED | OUTPATIENT
Start: 2020-10-29 | End: 2020-10-29

## 2020-10-29 RX ORDER — OMEPRAZOLE 20 MG/1
20 CAPSULE, DELAYED RELEASE ORAL DAILY
Qty: 30 CAP | Refills: 0 | Status: SHIPPED | OUTPATIENT
Start: 2020-10-29 | End: 2022-09-01

## 2020-10-29 RX ORDER — PROMETHAZINE HYDROCHLORIDE 25 MG/1
25 TABLET ORAL EVERY 6 HOURS PRN
Qty: 15 TAB | Refills: 0 | Status: SHIPPED | OUTPATIENT
Start: 2020-10-29

## 2020-10-29 RX ORDER — ONDANSETRON 2 MG/ML
4 INJECTION INTRAMUSCULAR; INTRAVENOUS ONCE
Status: COMPLETED | OUTPATIENT
Start: 2020-10-29 | End: 2020-10-29

## 2020-10-29 RX ORDER — DEXTROSE AND SODIUM CHLORIDE 5; .9 G/100ML; G/100ML
INJECTION, SOLUTION INTRAVENOUS ONCE
Status: COMPLETED | OUTPATIENT
Start: 2020-10-29 | End: 2020-10-29

## 2020-10-29 RX ORDER — MORPHINE SULFATE 4 MG/ML
4 INJECTION, SOLUTION INTRAMUSCULAR; INTRAVENOUS ONCE
Status: COMPLETED | OUTPATIENT
Start: 2020-10-29 | End: 2020-10-29

## 2020-10-29 RX ORDER — ALBUTEROL SULFATE 90 UG/1
2 AEROSOL, METERED RESPIRATORY (INHALATION) EVERY 6 HOURS PRN
COMMUNITY

## 2020-10-29 RX ADMIN — DEXTROSE AND SODIUM CHLORIDE: 5; 900 INJECTION, SOLUTION INTRAVENOUS at 09:49

## 2020-10-29 RX ADMIN — MORPHINE SULFATE 4 MG: 4 INJECTION INTRAVENOUS at 06:17

## 2020-10-29 RX ADMIN — ONDANSETRON 4 MG: 2 INJECTION INTRAMUSCULAR; INTRAVENOUS at 08:45

## 2020-10-29 RX ADMIN — SODIUM CHLORIDE 1000 ML: 9 INJECTION, SOLUTION INTRAVENOUS at 05:25

## 2020-10-29 RX ADMIN — ONDANSETRON 4 MG: 2 INJECTION INTRAMUSCULAR; INTRAVENOUS at 05:25

## 2020-10-29 RX ADMIN — LIDOCAINE HYDROCHLORIDE 30 ML: 20 SOLUTION OROPHARYNGEAL at 05:25

## 2020-10-29 NOTE — ED NOTES
Pt in wheelchair to green 24.  Agree with triage note.  Pt in gown and placed on monitors.  ERP to see.

## 2020-10-29 NOTE — ED PROVIDER NOTES
"ED Provider Note    CHIEF COMPLAINT  Chief Complaint   Patient presents with   • Abdominal Pain   • N/V       HPI  Luba Deshpande is a 29 y.o. female who presents to the emergency department with abdominal pain, nausea vomiting.  Patient started getting sick yesterday at about noon.  Is vomited multiple times.  Has not been able to keep really anything down but has been trying to drink Pedialyte.  He has not had any bilious or bloody emesis.  She has had a few episodes of small watery diarrhea.  She states she has pain when she vomits or diarrhea, but does not have any significant abdominal pain currently.  She does complain of significant pain in her lower central chest.  No radiation to back.  Burning sharp character.  Severe.  She has had chills without fever.  She does report cough, congestion, runny nose and sore throat.  Denies dysuria hematuria frequency.  Denies lower abdominal pain.  No abnormal vaginal discharge or bleeding.  She does not vomit on a regular basis.  Denies illicit drugs including cannabis.  Denies alcohol.      REVIEW OF SYSTEMS  Has had some tingling around her mouth and in both of her hands periodically during this illness.  As per HPI, otherwise a 10 point review of systems is negative    PAST MEDICAL HISTORY  Past Medical History:   Diagnosis Date   • Headache, classical migraine 2006       SOCIAL HISTORY  Social History     Tobacco Use   • Smoking status: Never Smoker   • Smokeless tobacco: Never Used   Substance Use Topics   • Alcohol use: No     Comment: very occasional    • Drug use: No       SURGICAL HISTORY  History reviewed. No pertinent surgical history.    CURRENT MEDICATIONS  Home Medications    **Home medications have not yet been reviewed for this encounter**         ALLERGIES  No Known Allergies    PHYSICAL EXAM  VITAL SIGNS: /107   Pulse 76   Temp 37.1 °C (98.7 °F) (Temporal)   Resp 16   Ht 1.702 m (5' 7\")   Wt 63.5 kg (140 lb)   SpO2 99%   BMI 21.93 " kg/m²    Constitutional: Awake and alert.  Laying on her left side holding an emesis basin.  HENT:  Atraumatic, Normocephalic.Oropharynx dry mucus membranes, Nose normal inspection.   Eyes: Normal inspection  Neck: Supple  Cardiovascular: Normal heart rate, Normal rhythm.  Symmetric peripheral pulses.   Thorax & Lungs: No respiratory distress, No wheezing, No rales, No rhonchi, No chest tenderness.   Abdomen: Bowel sounds normal, soft, non-distended, mild diffuse tenderness.  No rebound or peritonitis  Skin: Warm, Dry, No rash.   Back: No tenderness, No CVA tenderness.   Extremities: No clubbing, cyanosis, edema, no Homans or cords   Neurologic: Grossly normal   Psychiatric: Anxious appearing    RADIOLOGY/PROCEDURES  US-RUQ   Final Result         1.  Hepatomegaly, otherwise unremarkable right upper quadrant sonogram      DX-CHEST-PORTABLE (1 VIEW)   Final Result         1.  No acute cardiopulmonary disease.          Labs:  Results for orders placed or performed during the hospital encounter of 10/29/20   CBC WITH DIFFERENTIAL   Result Value Ref Range    WBC 15.0 (H) 4.8 - 10.8 K/uL    RBC 4.63 4.20 - 5.40 M/uL    Hemoglobin 14.6 12.0 - 16.0 g/dL    Hematocrit 41.5 37.0 - 47.0 %    MCV 89.6 81.4 - 97.8 fL    MCH 31.5 27.0 - 33.0 pg    MCHC 35.2 (H) 33.6 - 35.0 g/dL    RDW 42.0 35.9 - 50.0 fL    Platelet Count 345 164 - 446 K/uL    MPV 9.1 9.0 - 12.9 fL    Neutrophils-Polys 94.00 (H) 44.00 - 72.00 %    Lymphocytes 3.20 (L) 22.00 - 41.00 %    Monocytes 1.90 0.00 - 13.40 %    Eosinophils 0.10 0.00 - 6.90 %    Basophils 0.30 0.00 - 1.80 %    Immature Granulocytes 0.50 0.00 - 0.90 %    Nucleated RBC 0.00 /100 WBC    Neutrophils (Absolute) 14.12 (H) 2.00 - 7.15 K/uL    Lymphs (Absolute) 0.48 (L) 1.00 - 4.80 K/uL    Monos (Absolute) 0.28 0.00 - 0.85 K/uL    Eos (Absolute) 0.01 0.00 - 0.51 K/uL    Baso (Absolute) 0.05 0.00 - 0.12 K/uL    Immature Granulocytes (abs) 0.07 0.00 - 0.11 K/uL    NRBC (Absolute) 0.00 K/uL   COMP  METABOLIC PANEL   Result Value Ref Range    Sodium 138 135 - 145 mmol/L    Potassium 3.7 3.6 - 5.5 mmol/L    Chloride 104 96 - 112 mmol/L    Co2 16 (L) 20 - 33 mmol/L    Anion Gap 18.0 (H) 7.0 - 16.0    Glucose 184 (H) 65 - 99 mg/dL    Bun 15 8 - 22 mg/dL    Creatinine 0.86 0.50 - 1.40 mg/dL    Calcium 9.6 8.5 - 10.5 mg/dL    AST(SGOT) 57 (H) 12 - 45 U/L    ALT(SGPT) 58 (H) 2 - 50 U/L    Alkaline Phosphatase 70 30 - 99 U/L    Total Bilirubin 0.7 0.1 - 1.5 mg/dL    Albumin 4.6 3.2 - 4.9 g/dL    Total Protein 7.9 6.0 - 8.2 g/dL    Globulin 3.3 1.9 - 3.5 g/dL    A-G Ratio 1.4 g/dL   LIPASE   Result Value Ref Range    Lipase 18 11 - 82 U/L   HCG QUAL SERUM   Result Value Ref Range    Beta-Hcg Qualitative Serum Negative Negative   URINALYSIS CULTURE, IF INDICATED    Specimen: Urine   Result Value Ref Range    Color DK Yellow     Character Clear     Specific Gravity 1.037 <1.035    Ph 6.5 5.0 - 8.0    Glucose >=1000 (A) Negative mg/dL    Ketones >=160 Negative mg/dL    Protein 100 (A) Negative mg/dL    Bilirubin Negative Negative    Urobilinogen, Urine 0.2 Negative    Nitrite Negative Negative    Leukocyte Esterase Negative Negative    Occult Blood Negative Negative    Micro Urine Req Microscopic    COVID/SARS CoV-2 PCR    Specimen: Nasopharyngeal; Respirate   Result Value Ref Range    COVID Order Status Received    ESTIMATED GFR   Result Value Ref Range    GFR If African American >60 >60 mL/min/1.73 m 2    GFR If Non African American >60 >60 mL/min/1.73 m 2   URINE MICROSCOPIC (W/UA)   Result Value Ref Range    WBC 10-20 (A) /hpf    RBC 2-5 (A) /hpf    Bacteria Moderate (A) None /hpf    Epithelial Cells Moderate (A) /hpf    Hyaline Cast 3-5 (A) /lpf   SARS-CoV-2, PCR (In-House)   Result Value Ref Range    SARS-CoV-2 Source NP Swab    EKG   Result Value Ref Range    Report       [facility]    Test Date:  2020-10-29  Pt Name:    ETHAN KILGORE                Department:   MRN:         [id number]                      Room:       [facility]  Gender:     Female                       Technician: [id number]  :        [id number]                   Requested By:RACHAEL MEEHAN  Order #:    [id number]                    Reading MD: RACHAEL MEEHAN MD    Measurements  Intervals                                Axis  Rate:       81                           P:          0  MD:         195                          QRS:        67  QRSD:       84                           T:          8  QT:         440  QTc:        511    Interpretive Statements  SINUS RHYTHM  BORDERLINE T ABNORMALITIES, INFERIOR LEADS  PROLONGED QT INTERVAL  No previous ECG available for comparison  Electronically Signed On 10- 8:44:19 PDT by RACHAEL MEEHAN MD         Medications   hyoscyamine-maalox plus-lidocaine viscous (GI COCKTAIL) oral susp 30 mL (30 mL Oral Given 10/29/20 0525)   ondansetron (ZOFRAN) syringe/vial injection 4 mg (4 mg Intravenous Given 10/29/20 0525)   NS infusion 1,000 mL (0 mL Intravenous Stopped 10/29/20 0625)   morphine (pf) 4 mg/mL injection 4 mg (4 mg Intravenous Given 10/29/20 06)   ondansetron (ZOFRAN) syringe/vial injection 4 mg (4 mg Intravenous Given 10/29/20 0845)   D5 NS infusion ( Intravenous New Bag 10/29/20 0949)       HYDRATION: Based on the patient's presentation of Acute Vomiting the patient was given IV fluids. IV Hydration was used because oral hydration was not adequate alone. Upon recheck following hydration, the patient was Improved.    COURSE & MEDICAL DECISION MAKING  Patient presents to the ER with nausea vomiting.  She primarily has pain in her chest.  Characterized as a burning sensation.  I suspect this is esophagitis.  She appeared dehydrated.  Ordered IV fluids.  Ordered Zofran.  An EKG was obtained.  This demonstrated a long QT.  Likely related to the Zofran itself.  She was given a GI cocktail and had persistent chest discomfort.  She was given morphine following this and her pain  resolved.  Her chest x-ray does not show any abnormalities.  She does not have any cardiac findings on exam.  No suggestion of Boerhaave's.  Laboratory data returned showing leukocytosis.  She has elevated LFTs.  I ordered an ultrasound.  This was negative for acute findings.  Patient has anion gap acidosis.  Her blood sugar is only 184.  I suspect these findings are related to her acute illness.  Considered obtaining CT scan as the patient was still nauseous and had vomiting, but her symptoms resolved.  I repeated her abdominal exam 3 times.  She had no surgical findings or any significant tenderness.  No tenderness at all on her last exam.  Patient was given oral challenge.  She is eating crackers and drink 2 cups of juice.  Her vital signs normalized.  At this point she appears appropriate for outpatient management.  She will need to have repeat blood work.  [facility]  I have given the patient precautions that she could be early in the process of an acute surgical emergency and she will need to return to the ER if she has any persistent abdominal pain in 24 hours, dehydration, recurrent symptoms, not improving or concern.  She was given a prescription for Phenergan to take as needed.    FINAL IMPRESSION  1.  Nausea and vomiting  2.  Chest pain, suspected esophagitis  3.  Leukocytosis, secondary to vomiting  4.  Metabolic acidosis, secondary to vomiting  5.  Suspected viral illness  6.  Rule out COVID-19 virus infection      This dictation was created using voice recognition software. The accuracy of the dictation is limited to the abilities of the software.  The nursing notes were reviewed and certain aspects of this information were incorporated into this note.      Electronically signed by: Gian Odell M.D., 10/29/2020 5:18 AM

## 2020-10-29 NOTE — ED NOTES
Pharmacy Medication Reconciliation      Medication reconciliation updated and complete per pt at bedside  Allergies have been verified  No oral ABX within the last 14 days  Patient home pharmacy:[facility]

## 2020-10-29 NOTE — ED NOTES
Pt discharge home. Pt given discharge instructions and prescription. Pt verbalized understanding, all questions answered ,vss upon d/c. Pt steady on feet upon discharge  Pt's family will be driving her home

## 2020-10-29 NOTE — ED TRIAGE NOTES
"Luba Deshpande   29 y.o. female   Chief Complaint   Patient presents with   • Abdominal Pain   • N/V      The patient presents to the ED via triage for evaluation of non-specific abdominal pain, nausea and vomiting that has persisted for a couple of days. The patient denies abdominal surgeries. Denies any  symptoms. Denies the possibility of pregnancy. She is accompanied by her boyfriend who acts as historian. The patient participates in a limited capacity during triage assessment.     Patient is oriented, speaking in full sentences and following commands.  Not in any apparent distress.   Respirations are even and unlabored.   Patient placed in lobby. Patient educated on triage process. Patient encouraged to alert staff of any changes in status.     The patient denies knowingly being around anyone with suspected or confirmed COVID 19.    /107   Pulse 76   Temp 37.1 °C (98.7 °F) (Temporal)   Resp 16   Ht 1.702 m (5' 7\")   Wt 63.5 kg (140 lb)   SpO2 99%   BMI 21.93 kg/m²       "

## 2020-10-31 LAB
BACTERIA UR CULT: NORMAL
SIGNIFICANT IND 70042: NORMAL
SITE SITE: NORMAL
SOURCE SOURCE: NORMAL

## 2021-08-03 ENCOUNTER — HOSPITAL ENCOUNTER (EMERGENCY)
Facility: MEDICAL CENTER | Age: 31
End: 2021-08-04
Attending: EMERGENCY MEDICINE
Payer: OTHER GOVERNMENT

## 2021-08-03 ENCOUNTER — APPOINTMENT (OUTPATIENT)
Dept: RADIOLOGY | Facility: MEDICAL CENTER | Age: 31
End: 2021-08-03
Attending: EMERGENCY MEDICINE
Payer: OTHER GOVERNMENT

## 2021-08-03 DIAGNOSIS — R07.9 CHEST PAIN, UNSPECIFIED TYPE: ICD-10-CM

## 2021-08-03 DIAGNOSIS — K29.00 ACUTE GASTRITIS WITHOUT HEMORRHAGE, UNSPECIFIED GASTRITIS TYPE: ICD-10-CM

## 2021-08-03 DIAGNOSIS — R11.2 NAUSEA AND VOMITING, INTRACTABILITY OF VOMITING NOT SPECIFIED, UNSPECIFIED VOMITING TYPE: ICD-10-CM

## 2021-08-03 DIAGNOSIS — R91.1 PULMONARY NODULE: ICD-10-CM

## 2021-08-03 LAB
ALBUMIN SERPL BCP-MCNC: 4.5 G/DL (ref 3.2–4.9)
ALBUMIN/GLOB SERPL: 1.3 G/DL
ALP SERPL-CCNC: 79 U/L (ref 30–99)
ALT SERPL-CCNC: 56 U/L (ref 2–50)
ANION GAP SERPL CALC-SCNC: 19 MMOL/L (ref 7–16)
AST SERPL-CCNC: 58 U/L (ref 12–45)
BASOPHILS # BLD AUTO: 0.3 % (ref 0–1.8)
BASOPHILS # BLD: 0.05 K/UL (ref 0–0.12)
BILIRUB SERPL-MCNC: 0.5 MG/DL (ref 0.1–1.5)
BUN SERPL-MCNC: 19 MG/DL (ref 8–22)
CALCIUM SERPL-MCNC: 9.7 MG/DL (ref 8.4–10.2)
CHLORIDE SERPL-SCNC: 104 MMOL/L (ref 96–112)
CO2 SERPL-SCNC: 17 MMOL/L (ref 20–33)
CREAT SERPL-MCNC: 0.91 MG/DL (ref 0.5–1.4)
EOSINOPHIL # BLD AUTO: 0 K/UL (ref 0–0.51)
EOSINOPHIL NFR BLD: 0 % (ref 0–6.9)
ERYTHROCYTE [DISTWIDTH] IN BLOOD BY AUTOMATED COUNT: 42.3 FL (ref 35.9–50)
FLUAV RNA SPEC QL NAA+PROBE: NEGATIVE
FLUBV RNA SPEC QL NAA+PROBE: NEGATIVE
GLOBULIN SER CALC-MCNC: 3.6 G/DL (ref 1.9–3.5)
GLUCOSE SERPL-MCNC: 150 MG/DL (ref 65–99)
HCG SERPL QL: NEGATIVE
HCT VFR BLD AUTO: 45.5 % (ref 37–47)
HGB BLD-MCNC: 15.8 G/DL (ref 12–16)
IMM GRANULOCYTES # BLD AUTO: 0.12 K/UL (ref 0–0.11)
IMM GRANULOCYTES NFR BLD AUTO: 0.6 % (ref 0–0.9)
LIPASE SERPL-CCNC: 17 U/L (ref 7–58)
LYMPHOCYTES # BLD AUTO: 0.46 K/UL (ref 1–4.8)
LYMPHOCYTES NFR BLD: 2.5 % (ref 22–41)
MCH RBC QN AUTO: 31.9 PG (ref 27–33)
MCHC RBC AUTO-ENTMCNC: 34.7 G/DL (ref 33.6–35)
MCV RBC AUTO: 91.9 FL (ref 81.4–97.8)
MONOCYTES # BLD AUTO: 0.36 K/UL (ref 0–0.85)
MONOCYTES NFR BLD AUTO: 1.9 % (ref 0–13.4)
NEUTROPHILS # BLD AUTO: 17.63 K/UL (ref 2–7.15)
NEUTROPHILS NFR BLD: 94.7 % (ref 44–72)
NRBC # BLD AUTO: 0 K/UL
NRBC BLD-RTO: 0 /100 WBC
PLATELET # BLD AUTO: 347 K/UL (ref 164–446)
PMV BLD AUTO: 8.9 FL (ref 9–12.9)
POTASSIUM SERPL-SCNC: 4.2 MMOL/L (ref 3.6–5.5)
PROT SERPL-MCNC: 8.1 G/DL (ref 6–8.2)
RBC # BLD AUTO: 4.95 M/UL (ref 4.2–5.4)
RSV RNA SPEC QL NAA+PROBE: NEGATIVE
SARS-COV-2 RNA RESP QL NAA+PROBE: NOTDETECTED
SODIUM SERPL-SCNC: 140 MMOL/L (ref 135–145)
SPECIMEN SOURCE: NORMAL
TROPONIN T SERPL-MCNC: <6 NG/L (ref 6–19)
WBC # BLD AUTO: 18.6 K/UL (ref 4.8–10.8)

## 2021-08-03 PROCEDURE — 84703 CHORIONIC GONADOTROPIN ASSAY: CPT

## 2021-08-03 PROCEDURE — 700111 HCHG RX REV CODE 636 W/ 250 OVERRIDE (IP): Performed by: EMERGENCY MEDICINE

## 2021-08-03 PROCEDURE — 83690 ASSAY OF LIPASE: CPT

## 2021-08-03 PROCEDURE — 71045 X-RAY EXAM CHEST 1 VIEW: CPT

## 2021-08-03 PROCEDURE — 99285 EMERGENCY DEPT VISIT HI MDM: CPT

## 2021-08-03 PROCEDURE — 700102 HCHG RX REV CODE 250 W/ 637 OVERRIDE(OP): Performed by: EMERGENCY MEDICINE

## 2021-08-03 PROCEDURE — 93005 ELECTROCARDIOGRAM TRACING: CPT | Performed by: EMERGENCY MEDICINE

## 2021-08-03 PROCEDURE — 71260 CT THORAX DX C+: CPT

## 2021-08-03 PROCEDURE — 96376 TX/PRO/DX INJ SAME DRUG ADON: CPT | Mod: XU

## 2021-08-03 PROCEDURE — 80053 COMPREHEN METABOLIC PANEL: CPT

## 2021-08-03 PROCEDURE — C9803 HOPD COVID-19 SPEC COLLECT: HCPCS | Performed by: EMERGENCY MEDICINE

## 2021-08-03 PROCEDURE — 700105 HCHG RX REV CODE 258: Performed by: EMERGENCY MEDICINE

## 2021-08-03 PROCEDURE — 700117 HCHG RX CONTRAST REV CODE 255: Performed by: EMERGENCY MEDICINE

## 2021-08-03 PROCEDURE — 96375 TX/PRO/DX INJ NEW DRUG ADDON: CPT | Mod: XU

## 2021-08-03 PROCEDURE — 96374 THER/PROPH/DIAG INJ IV PUSH: CPT | Mod: XU

## 2021-08-03 PROCEDURE — 85025 COMPLETE CBC W/AUTO DIFF WBC: CPT

## 2021-08-03 PROCEDURE — 0241U HCHG SARS-COV-2 COVID-19 NFCT DS RESP RNA 4 TRGT MIC: CPT

## 2021-08-03 PROCEDURE — 84484 ASSAY OF TROPONIN QUANT: CPT

## 2021-08-03 PROCEDURE — A9270 NON-COVERED ITEM OR SERVICE: HCPCS | Performed by: EMERGENCY MEDICINE

## 2021-08-03 PROCEDURE — 93005 ELECTROCARDIOGRAM TRACING: CPT

## 2021-08-03 RX ORDER — ONDANSETRON 2 MG/ML
4 INJECTION INTRAMUSCULAR; INTRAVENOUS ONCE
Status: COMPLETED | OUTPATIENT
Start: 2021-08-03 | End: 2021-08-03

## 2021-08-03 RX ORDER — MORPHINE SULFATE 4 MG/ML
4 INJECTION, SOLUTION INTRAMUSCULAR; INTRAVENOUS ONCE
Status: COMPLETED | OUTPATIENT
Start: 2021-08-03 | End: 2021-08-03

## 2021-08-03 RX ORDER — SODIUM CHLORIDE, SODIUM LACTATE, POTASSIUM CHLORIDE, AND CALCIUM CHLORIDE .6; .31; .03; .02 G/100ML; G/100ML; G/100ML; G/100ML
30 INJECTION, SOLUTION INTRAVENOUS ONCE
Status: COMPLETED | OUTPATIENT
Start: 2021-08-03 | End: 2021-08-03

## 2021-08-03 RX ADMIN — ONDANSETRON 4 MG: 2 INJECTION INTRAMUSCULAR; INTRAVENOUS at 20:11

## 2021-08-03 RX ADMIN — IOHEXOL 75 ML: 350 INJECTION, SOLUTION INTRAVENOUS at 23:06

## 2021-08-03 RX ADMIN — ONDANSETRON 4 MG: 2 INJECTION INTRAMUSCULAR; INTRAVENOUS at 23:23

## 2021-08-03 RX ADMIN — MORPHINE SULFATE 4 MG: 4 INJECTION INTRAVENOUS at 23:23

## 2021-08-03 RX ADMIN — LIDOCAINE HYDROCHLORIDE 30 ML: 20 SOLUTION OROPHARYNGEAL at 20:57

## 2021-08-03 RX ADMIN — SODIUM CHLORIDE, POTASSIUM CHLORIDE, SODIUM LACTATE AND CALCIUM CHLORIDE 1839 ML: 600; 310; 30; 20 INJECTION, SOLUTION INTRAVENOUS at 20:12

## 2021-08-03 RX ADMIN — FAMOTIDINE 20 MG: 10 INJECTION INTRAVENOUS at 20:57

## 2021-08-03 ASSESSMENT — FIBROSIS 4 INDEX: FIB4 SCORE: 0.67

## 2021-08-03 ASSESSMENT — PAIN DESCRIPTION - DESCRIPTORS: DESCRIPTORS: SHARP

## 2021-08-04 VITALS
OXYGEN SATURATION: 93 % | HEART RATE: 65 BPM | BODY MASS INDEX: 21.21 KG/M2 | WEIGHT: 135.14 LBS | HEIGHT: 67 IN | SYSTOLIC BLOOD PRESSURE: 104 MMHG | TEMPERATURE: 97.9 F | RESPIRATION RATE: 16 BRPM | DIASTOLIC BLOOD PRESSURE: 56 MMHG

## 2021-08-04 LAB
AMPHET UR QL SCN: NEGATIVE
APPEARANCE UR: CLEAR
BARBITURATES UR QL SCN: NEGATIVE
BENZODIAZ UR QL SCN: NEGATIVE
BILIRUB UR QL STRIP.AUTO: NEGATIVE
BZE UR QL SCN: NEGATIVE
CANNABINOIDS UR QL SCN: POSITIVE
COLOR UR: YELLOW
EKG IMPRESSION: NORMAL
GLUCOSE UR STRIP.AUTO-MCNC: NEGATIVE MG/DL
KETONES UR STRIP.AUTO-MCNC: 40 MG/DL
LEUKOCYTE ESTERASE UR QL STRIP.AUTO: NEGATIVE
METHADONE UR QL SCN: NEGATIVE
MICRO URNS: ABNORMAL
NITRITE UR QL STRIP.AUTO: NEGATIVE
OPIATES UR QL SCN: POSITIVE
OXYCODONE UR QL SCN: NEGATIVE
PCP UR QL SCN: NEGATIVE
PH UR STRIP.AUTO: 7 [PH] (ref 5–8)
PROPOXYPH UR QL SCN: NEGATIVE
PROT UR QL STRIP: NEGATIVE MG/DL
RBC UR QL AUTO: NEGATIVE
SP GR UR STRIP.AUTO: <=1.005

## 2021-08-04 PROCEDURE — 700111 HCHG RX REV CODE 636 W/ 250 OVERRIDE (IP): Performed by: EMERGENCY MEDICINE

## 2021-08-04 PROCEDURE — 80307 DRUG TEST PRSMV CHEM ANLYZR: CPT

## 2021-08-04 PROCEDURE — 81003 URINALYSIS AUTO W/O SCOPE: CPT

## 2021-08-04 RX ORDER — DIPHENHYDRAMINE HYDROCHLORIDE 50 MG/ML
12.5 INJECTION INTRAMUSCULAR; INTRAVENOUS ONCE
Status: COMPLETED | OUTPATIENT
Start: 2021-08-04 | End: 2021-08-04

## 2021-08-04 RX ORDER — ONDANSETRON 4 MG/1
4 TABLET, ORALLY DISINTEGRATING ORAL EVERY 8 HOURS PRN
Qty: 6 TABLET | Refills: 0 | Status: SHIPPED | OUTPATIENT
Start: 2021-08-04 | End: 2021-08-06

## 2021-08-04 RX ORDER — PROMETHAZINE HYDROCHLORIDE 25 MG/1
25 SUPPOSITORY RECTAL EVERY 8 HOURS PRN
Qty: 6 SUPPOSITORY | Refills: 0 | Status: SHIPPED | OUTPATIENT
Start: 2021-08-04 | End: 2021-08-06

## 2021-08-04 RX ORDER — METOCLOPRAMIDE HYDROCHLORIDE 5 MG/ML
10 INJECTION INTRAMUSCULAR; INTRAVENOUS ONCE
Status: COMPLETED | OUTPATIENT
Start: 2021-08-04 | End: 2021-08-04

## 2021-08-04 RX ADMIN — DIPHENHYDRAMINE HYDROCHLORIDE 12.5 MG: 50 INJECTION INTRAMUSCULAR; INTRAVENOUS at 01:09

## 2021-08-04 RX ADMIN — METOCLOPRAMIDE 10 MG: 5 INJECTION, SOLUTION INTRAMUSCULAR; INTRAVENOUS at 01:09

## 2021-08-04 NOTE — ED TRIAGE NOTES
Pt here for chest pain and n/v for last 24 hours. Pt states sob; able to speak in full sentences. Pt appear lethargic but is able to communicate with triage nurse. Pt appear cool to touch and diaphoretic. States unable to keep foods down for last 24 hours. Pt denies other sick around her recently. Is not vaccinated for COVID. Med hx: asthma.

## 2021-08-04 NOTE — ED PROVIDER NOTES
"ED Provider Note    CHIEF COMPLAINT  Chief Complaint   Patient presents with   • N/V   • Chest Pain       HPI  Luba Deshpande is a 30 y.o. female who presents to the ER with complaint of nausea and vomiting which began around 6:00 this morning.  2 hours later she developed chest pain.  She said the chest pain is sharp.  He gets worse or takes a deep breath.  She had similar chest pain in October 2020 when she had a very similar episode of nausea and vomiting which resulted in chest pain.  No diarrhea.  No abdominal pain.  She smokes marijuana.  No significant caffeine use at this time although she used to drink a lot of energy drinks.  She does not smoke cigarettes.  No alcohol in the last couple weeks.  No back pain.  No fevers or chills.  She says she had a slight cough which developed today.  Her significant other had upper respiratory symptoms over the last week.  He says he is over his symptoms except for an occasional cough.  He had a negative COVID-19 test done last week.  Patient is not vaccinated for COVID-19.  Patient says she is vomited \"100 times\" today.  She says she is done a lot of retching and dry heaving.  No blood in vomit.  Her emesis is yellow-green in color.  No black tarry stools.  Denies possibly of pregnancy.  No pain with urination.  No cloudy or foul-smelling urine.  No hematuria.  Patient said that the last time this happened she was given nausea medicines here in the ER and spent several hours here.  She was discharged home and was still queasy and had decreased appetite for several weeks until she finally got better.  She said that her \"liver was a little off\" when she was here in the fall with the same symptoms.    REVIEW OF SYSTEMS  See HPI for further details. All other systems are negative.    PAST MEDICAL HISTORY  Past Medical History:   Diagnosis Date   • Headache, classical migraine 2006       FAMILY HISTORY  Family History   Problem Relation Age of Onset   • Cancer Mother " 30        ovarian    • Diabetes Mother    • Diabetes Maternal Grandfather    • Diabetes Paternal Grandmother    • Diabetes Maternal Grandmother    • Diabetes Paternal Grandfather        SOCIAL HISTORY  Social History     Socioeconomic History   • Marital status: Single     Spouse name: Not on file   • Number of children: Not on file   • Years of education: Not on file   • Highest education level: Not on file   Occupational History   • Not on file   Tobacco Use   • Smoking status: Never Smoker   • Smokeless tobacco: Never Used   Substance and Sexual Activity   • Alcohol use: No     Comment: very occasional    • Drug use: No   • Sexual activity: Yes     Birth control/protection: Pill   Other Topics Concern   • Not on file   Social History Narrative   • Not on file     Social Determinants of Health     Financial Resource Strain:    • Difficulty of Paying Living Expenses:    Food Insecurity:    • Worried About Running Out of Food in the Last Year:    • Ran Out of Food in the Last Year:    Transportation Needs:    • Lack of Transportation (Medical):    • Lack of Transportation (Non-Medical):    Physical Activity:    • Days of Exercise per Week:    • Minutes of Exercise per Session:    Stress:    • Feeling of Stress :    Social Connections:    • Frequency of Communication with Friends and Family:    • Frequency of Social Gatherings with Friends and Family:    • Attends Zoroastrianism Services:    • Active Member of Clubs or Organizations:    • Attends Club or Organization Meetings:    • Marital Status:    Intimate Partner Violence:    • Fear of Current or Ex-Partner:    • Emotionally Abused:    • Physically Abused:    • Sexually Abused:        SURGICAL HISTORY  No past surgical history on file.    CURRENT MEDICATIONS  Home Medications     Reviewed by Justyna Moore R.N. (Registered Nurse) on 08/03/21 at 1915  Med List Status: Not Addressed   Medication Last Dose Status   albuterol 108 (90 Base) MCG/ACT Aero Soln  "inhalation aerosol  Active   drospirenone-ethinyl estradiol (ZEUS) 3-0.03 MG per tablet  Active   esomeprazole (NEXIUM) 20 MG capsule  Active   omeprazole (PRILOSEC) 20 MG delayed-release capsule  Active   Phenylephrine-Acetaminophen 5-325 MG Tab  Active   promethazine (PHENERGAN) 25 MG Tab  Active                ALLERGIES  No Known Allergies    PHYSICAL EXAM  VITAL SIGNS: /94   Pulse 80   Temp (!) 35.6 °C (96.1 °F) (Oral)   Resp 16   Ht 1.702 m (5' 7\")   Wt 61.3 kg (135 lb 2.3 oz)   SpO2 99%   BMI 21.17 kg/m²      Constitutional: Actively vomiting up yellowish-green substance.  Appears uncomfortable.  Slightly ill-appearing.  HENT: Normocephalic, atraumatic; Bilateral external ears normal; dry mucous membranes   Eyes: PERRL, EOMI, Conjunctiva normal. No discharge.   Neck:  Supple, nontender midline; No stridor; No nuchal rigidity.   Lymphatic: No cervical lymphadenopathy noted.   Cardiovascular: Regular rate and rhythm without murmurs, rubs, or gallop.   Thorax & Lungs: No respiratory distress, breath sounds clear to auscultation bilaterally without wheezing, rales or rhonchi. Nontender chest wall. No crepitus or subcutaneous air  Abdomen: Soft, tender in the midepigastrium, bowel sounds normal. No obvious masses; No pulsatile masses; no rebound, guarding, or peritoneal signs.   Skin: Good color; warm and dry without rash or petechia.  Back: Nontender, No CVA tenderness.   Extremities: Distal radial, dorsalis pedis, posterior tibial pulses are equal bilaterally; No edema; Nontender calves or saphenous, No cyanosis, No clubbing.   Musculoskeletal: Good range of motion in all major joints. No tenderness to palpation or major deformities noted.   Neurologic: Alert & oriented x 4, clear speech      RADIOLOGY/PROCEDURES  CT-CHEST (THORAX) WITH   Final Result      1.  5 mm noncalcified pulmonary nodule in the right middle lobe.      2.  Clear chest.      3.  No thoracic adenopathy or pleural effusion.    "   Fleischner Society pulmonary nodule recommendations:   Low Risk: No routine follow-up      High Risk: Optional CT at 12 months      Comments: Nodules less than 6 mm do not require routine follow-up, but certain patients at high risk with suspicious nodule morphology, upper lobe location, or both may warrant 12-month follow-up.      Low Risk - Minimal or absent history of smoking and of other known risk factors.      High Risk - History of smoking or of other known risk factors.      Note: These recommendations do not apply to lung cancer screening, patients with immunosuppression, or patients with known primary cancer.      Fleischner Society 2017 Guidelines for Management of Incidentally Detected Pulmonary Nodules in Adults         DX-CHEST-PORTABLE (1 VIEW)   Final Result      Negative single view of the chest.          COURSE & MEDICAL DECISION MAKING  Pertinent Labs & Imaging studies reviewed. (See chart for details)  Results for orders placed or performed during the hospital encounter of 08/03/21   CBC with Differential   Result Value Ref Range    WBC 18.6 (H) 4.8 - 10.8 K/uL    RBC 4.95 4.20 - 5.40 M/uL    Hemoglobin 15.8 12.0 - 16.0 g/dL    Hematocrit 45.5 37.0 - 47.0 %    MCV 91.9 81.4 - 97.8 fL    MCH 31.9 27.0 - 33.0 pg    MCHC 34.7 33.6 - 35.0 g/dL    RDW 42.3 35.9 - 50.0 fL    Platelet Count 347 164 - 446 K/uL    MPV 8.9 (L) 9.0 - 12.9 fL    Neutrophils-Polys 94.70 (H) 44.00 - 72.00 %    Lymphocytes 2.50 (L) 22.00 - 41.00 %    Monocytes 1.90 0.00 - 13.40 %    Eosinophils 0.00 0.00 - 6.90 %    Basophils 0.30 0.00 - 1.80 %    Immature Granulocytes 0.60 0.00 - 0.90 %    Nucleated RBC 0.00 /100 WBC    Neutrophils (Absolute) 17.63 (H) 2.00 - 7.15 K/uL    Lymphs (Absolute) 0.46 (L) 1.00 - 4.80 K/uL    Monos (Absolute) 0.36 0.00 - 0.85 K/uL    Eos (Absolute) 0.00 0.00 - 0.51 K/uL    Baso (Absolute) 0.05 0.00 - 0.12 K/uL    Immature Granulocytes (abs) 0.12 (H) 0.00 - 0.11 K/uL    NRBC (Absolute) 0.00 K/uL    Complete Metabolic Panel (CMP)   Result Value Ref Range    Sodium 140 135 - 145 mmol/L    Potassium 4.2 3.6 - 5.5 mmol/L    Chloride 104 96 - 112 mmol/L    Co2 17 (L) 20 - 33 mmol/L    Anion Gap 19.0 (H) 7.0 - 16.0    Glucose 150 (H) 65 - 99 mg/dL    Bun 19 8 - 22 mg/dL    Creatinine 0.91 0.50 - 1.40 mg/dL    Calcium 9.7 8.4 - 10.2 mg/dL    AST(SGOT) 58 (H) 12 - 45 U/L    ALT(SGPT) 56 (H) 2 - 50 U/L    Alkaline Phosphatase 79 30 - 99 U/L    Total Bilirubin 0.5 0.1 - 1.5 mg/dL    Albumin 4.5 3.2 - 4.9 g/dL    Total Protein 8.1 6.0 - 8.2 g/dL    Globulin 3.6 (H) 1.9 - 3.5 g/dL    A-G Ratio 1.3 g/dL   Troponin   Result Value Ref Range    Troponin T <6 6 - 19 ng/L   ESTIMATED GFR   Result Value Ref Range    GFR If African American >60 >60 mL/min/1.73 m 2    GFR If Non African American >60 >60 mL/min/1.73 m 2   HCG QUAL SERUM   Result Value Ref Range    Beta-Hcg Qualitative Serum Negative Negative   LIPASE   Result Value Ref Range    Lipase 17 7 - 58 U/L   COV-2, FLU A/B, AND RSV BY PCR (2-4 HOURS CEPHEID): Collect NP swab in VTM    Specimen: Nasopharyngeal; Respirate   Result Value Ref Range    Influenza virus A RNA Negative Negative    Influenza virus B, PCR Negative Negative    RSV, PCR Negative Negative    SARS-CoV-2 by PCR NotDetected     SARS-CoV-2 Source NP Swab    URINALYSIS (UA)    Specimen: Urine, Clean Catch   Result Value Ref Range    Color Yellow     Character Clear     Specific Gravity <=1.005 <1.035    Ph 7.0 5.0 - 8.0    Glucose Negative Negative mg/dL    Ketones 40 (A) Negative mg/dL    Protein Negative Negative mg/dL    Bilirubin Negative Negative    Nitrite Negative Negative    Leukocyte Esterase Negative Negative    Occult Blood Negative Negative    Micro Urine Req see below    URINE DRUG SCREEN   Result Value Ref Range    Amphetamines Urine Negative Negative    Barbiturates Negative Negative    Benzodiazepines Negative Negative    Cocaine Metabolite Negative Negative    Methadone Negative Negative  "   Opiates Positive (A) Negative    Oxycodone Negative Negative    Phencyclidine -Pcp Negative Negative    Propoxyphene Negative Negative    Cannabinoid Metab Positive (A) Negative   EKG   Result Value Ref Range    Report       [facility]    Test Date:  2021  Pt Name:    ETHAN KILGORE                Department: [facility]  MRN:        [id number]                      [facility]  Gender:     Female                       Technician: CAROLYN  :        [id number]                   Requested By:ER TRIAGE PROTOCOL  Order #:    [id number]                    Reading MD: Karely Corbin    Measurements  Intervals                                Axis  Rate:       71                           P:          3  NH:         102                          QRS:        69  QRSD:       93                           T:          43  QT:         442  QTc:        481    Interpretive Statements  Sinus rhythm 71  Normal axis  No ST depression/elevation  Short NH interval  Borderline prolonged QT interval  Compared to ECG 10/29/2020 05:16:31  Short NH interval now present  T-wave abnormality no longer present  Electronically Signed On 2021 1:29:58 PDT by Karely Corbin         Patient presents to the ER complaining of nausea vomiting which began this morning.  She said yesterday she felt a little bit nauseated and had some decreased appetite.  She started vomiting at 6 AM and by 8 AM she had chest pain.  She said the chest pain feels identical to the pain that she had back in 2020 when she presented with the same symptoms.  She says that other than \"her liver being a little off\" at that time, she said they did not come up with a reason for her symptoms.  Patient says she is vomited \"100 times\" over the course of today.  Chest pain began after vomiting.  I suspect she has some esophageal irritation from all the vomiting.  She reports a little bit of a cough which started today.  Her significant other " had upper respiratory symptoms last week and tested negative for COVID-19.  Patient is negative for COVID-19 today.  She has not had any diarrhea.  No abdominal pain.  She was tender in the midepigastrium.  Is concerned about the possibility of pancreatitis or gallbladder pathology.  She had an ultrasound of the right upper quadrant done last time she was here in October 2020 with the same symptoms.  It showed some mild hepatomegaly but otherwise negative.  Gallbladder was normal without any gallstones identified.  Patient had an elevated white blood cell count at that time with a left shift.  She is elevated white blood cell count with left shift again today.  Lipase is negative.  Note is for pancreatitis.  Liver function tests are minimally elevated but there nearly identical to her liver function tests from October 2020 when she presented with same symptoms.  Patient has not had any fever.  She has a mild anion gap acidosis.  I think this is related to dehydration.  Sugars little high at 150.  No glucose in urine.  I do not think she has DKA.  Patient was given nausea medication and IV fluids.  She was given Pepcid and GI cocktail.  She said that did not help her chest pain.  Review of the records from October 2020 revealed that morphine took away her pain.  I gave her morphine here today and that resolved her discomfort.  I did a p.o. challenge.  She vomited after p.o. challenge.  I gave her another round of Zofran and after second round of Zofran she is now tolerating p.o. fluids.  Her chest pain is better.  I did a CT scan of the chest to be sure she did not have Boerhaave syndrome or pneumonia or anything else causing her chest pain.  Her EKG is nonacute and her troponin after over 12 hours of symptoms is negative.  At this time evidence for STEMI, non-STEMI, myocarditis or pericarditis.  After work-up here in the ER she has been reevaluated several times.  She is appearing much better.  She feels much  better.  I think she is safe and stable for outpatient management discharge home.  She has a soft and nontender abdomen on examination.  At this time no concern for appendicitis, cholecystitis, obstruction, perforation or any other dangerous abdominal pathology.  Her main complaint throughout this entire time is been chest pain.  Chest CT is negative.  She has been advised to follow-up with primary care regarding the pulmonary nodule.  Patient understands and agrees.  She will be on clear liquid diet for the next 24 hours.  She is to return in 12 to 18 hours for recheck unless she is feeling significantly improved.  Patient has been given strict return precautions and discharge instructions and she understands if she gets worse in any way she must return the ER immediately.  We will send her home with Zofran and Phenergan suppositories if the Zofran does not help her at home.    HYDRATION: Based on the patient's presentation of Acute Vomiting the patient was given IV fluids. IV Hydration was used because oral hydration was not as rapid as required. Upon recheck following hydration, the patient was improved.    I verified that the patient was wearing a mask and I was wearing appropriate PPE every time I entered the room. The patient's mask was on the patient at all times during my encounter except for a brief view of the oropharynx.    FINAL IMPRESSION  1. Pulmonary nodule Acute    2. Nausea and vomiting, intractability of vomiting not specified, unspecified vomiting type Acute    3. Chest pain, unspecified type Acute    4. Acute gastritis without hemorrhage, unspecified gastritis type Acute         This dictation has been created using voice recognition software. The accuracy of the dictation is limited by the abilities of the software. I expect there may be some errors of grammar and possibly content. I made every attempt to manually correct the errors within my dictation. However, errors related to voice  recognition software may still exist and should be interpreted within the appropriate context.    Electronically signed by: Karely Corbin M.D., 8/3/2021 8:05 PM

## 2021-08-04 NOTE — DISCHARGE INSTRUCTIONS
Please follow-up with your primary care physician within the next 1 to 2 days.  When you follow-up with your primary care doctor, please let them know that a pulmonary nodule was seen today on your chest CT.  This will need follow-up.  If you do not have a primary care physician, follow-up with one of the primary care doctors with the [facility] group.  Please call for appointment.    Return to the ER for any worsening chest pain, worsening nausea/vomiting, fevers over 100.4, shaking chills, shortness of breath, cough, coughing up phlegm or blood, abdominal pain, blood in vomit, black tarry stool, blood in stool, or for any concerns.    Clear liquid diet over the next 24 hours.    Return to the ER in 12 to 18 hours for recheck unless you are feeling significantly improved.

## 2022-08-27 ENCOUNTER — HOSPITAL ENCOUNTER (EMERGENCY)
Facility: MEDICAL CENTER | Age: 32
End: 2022-08-27
Attending: EMERGENCY MEDICINE
Payer: OTHER MISCELLANEOUS

## 2022-08-27 VITALS
SYSTOLIC BLOOD PRESSURE: 122 MMHG | WEIGHT: 135.14 LBS | HEIGHT: 67 IN | TEMPERATURE: 97.9 F | BODY MASS INDEX: 21.21 KG/M2 | RESPIRATION RATE: 16 BRPM | DIASTOLIC BLOOD PRESSURE: 79 MMHG | OXYGEN SATURATION: 94 % | HEART RATE: 59 BPM

## 2022-08-27 DIAGNOSIS — R10.9 ABDOMINAL PAIN, UNSPECIFIED ABDOMINAL LOCATION: ICD-10-CM

## 2022-08-27 DIAGNOSIS — R11.2 NON-INTRACTABLE VOMITING WITH NAUSEA, UNSPECIFIED VOMITING TYPE: ICD-10-CM

## 2022-08-27 DIAGNOSIS — R07.9 CHEST PAIN, UNSPECIFIED TYPE: ICD-10-CM

## 2022-08-27 LAB
ALBUMIN SERPL BCP-MCNC: 5.2 G/DL (ref 3.2–4.9)
ALBUMIN/GLOB SERPL: 1.5 G/DL
ALP SERPL-CCNC: 75 U/L (ref 30–99)
ALT SERPL-CCNC: 42 U/L (ref 2–50)
ANION GAP SERPL CALC-SCNC: 15 MMOL/L (ref 7–16)
ANION GAP SERPL CALC-SCNC: 17 MMOL/L (ref 7–16)
ANION GAP SERPL CALC-SCNC: 20 MMOL/L (ref 7–16)
APPEARANCE UR: CLEAR
AST SERPL-CCNC: 34 U/L (ref 12–45)
BASOPHILS # BLD AUTO: 0.2 % (ref 0–1.8)
BASOPHILS # BLD AUTO: 0.4 % (ref 0–1.8)
BASOPHILS # BLD: 0.03 K/UL (ref 0–0.12)
BASOPHILS # BLD: 0.07 K/UL (ref 0–0.12)
BILIRUB SERPL-MCNC: 0.5 MG/DL (ref 0.1–1.5)
BILIRUB UR QL STRIP.AUTO: NEGATIVE
BUN SERPL-MCNC: 17 MG/DL (ref 8–22)
BUN SERPL-MCNC: 17 MG/DL (ref 8–22)
BUN SERPL-MCNC: 18 MG/DL (ref 8–22)
CALCIUM SERPL-MCNC: 8.2 MG/DL (ref 8.5–10.5)
CALCIUM SERPL-MCNC: 8.9 MG/DL (ref 8.5–10.5)
CALCIUM SERPL-MCNC: 9.8 MG/DL (ref 8.5–10.5)
CHLORIDE SERPL-SCNC: 102 MMOL/L (ref 96–112)
CHLORIDE SERPL-SCNC: 102 MMOL/L (ref 96–112)
CHLORIDE SERPL-SCNC: 105 MMOL/L (ref 96–112)
CO2 SERPL-SCNC: 16 MMOL/L (ref 20–33)
CO2 SERPL-SCNC: 18 MMOL/L (ref 20–33)
CO2 SERPL-SCNC: 18 MMOL/L (ref 20–33)
COLOR UR: YELLOW
CREAT SERPL-MCNC: 0.85 MG/DL (ref 0.5–1.4)
CREAT SERPL-MCNC: 0.87 MG/DL (ref 0.5–1.4)
CREAT SERPL-MCNC: 0.98 MG/DL (ref 0.5–1.4)
EKG IMPRESSION: NORMAL
EOSINOPHIL # BLD AUTO: 0 K/UL (ref 0–0.51)
EOSINOPHIL # BLD AUTO: 0.04 K/UL (ref 0–0.51)
EOSINOPHIL NFR BLD: 0 % (ref 0–6.9)
EOSINOPHIL NFR BLD: 0.3 % (ref 0–6.9)
ERYTHROCYTE [DISTWIDTH] IN BLOOD BY AUTOMATED COUNT: 40.1 FL (ref 35.9–50)
ERYTHROCYTE [DISTWIDTH] IN BLOOD BY AUTOMATED COUNT: 40.1 FL (ref 35.9–50)
GFR SERPLBLD CREATININE-BSD FMLA CKD-EPI: 78 ML/MIN/1.73 M 2
GFR SERPLBLD CREATININE-BSD FMLA CKD-EPI: 90 ML/MIN/1.73 M 2
GFR SERPLBLD CREATININE-BSD FMLA CKD-EPI: 93 ML/MIN/1.73 M 2
GLOBULIN SER CALC-MCNC: 3.4 G/DL (ref 1.9–3.5)
GLUCOSE SERPL-MCNC: 109 MG/DL (ref 65–99)
GLUCOSE SERPL-MCNC: 109 MG/DL (ref 65–99)
GLUCOSE SERPL-MCNC: 117 MG/DL (ref 65–99)
GLUCOSE UR STRIP.AUTO-MCNC: NEGATIVE MG/DL
HCG SERPL QL: NEGATIVE
HCT VFR BLD AUTO: 41.3 % (ref 37–47)
HCT VFR BLD AUTO: 48.3 % (ref 37–47)
HGB BLD-MCNC: 14.8 G/DL (ref 12–16)
HGB BLD-MCNC: 17.7 G/DL (ref 12–16)
IMM GRANULOCYTES # BLD AUTO: 0.07 K/UL (ref 0–0.11)
IMM GRANULOCYTES # BLD AUTO: 0.07 K/UL (ref 0–0.11)
IMM GRANULOCYTES NFR BLD AUTO: 0.4 % (ref 0–0.9)
IMM GRANULOCYTES NFR BLD AUTO: 0.5 % (ref 0–0.9)
KETONES UR STRIP.AUTO-MCNC: 15 MG/DL
LEUKOCYTE ESTERASE UR QL STRIP.AUTO: NEGATIVE
LIPASE SERPL-CCNC: 41 U/L (ref 11–82)
LYMPHOCYTES # BLD AUTO: 1.08 K/UL (ref 1–4.8)
LYMPHOCYTES # BLD AUTO: 2.24 K/UL (ref 1–4.8)
LYMPHOCYTES NFR BLD: 14 % (ref 22–41)
LYMPHOCYTES NFR BLD: 7.7 % (ref 22–41)
MCH RBC QN AUTO: 32 PG (ref 27–33)
MCH RBC QN AUTO: 32.3 PG (ref 27–33)
MCHC RBC AUTO-ENTMCNC: 35.8 G/DL (ref 33.6–35)
MCHC RBC AUTO-ENTMCNC: 36.6 G/DL (ref 33.6–35)
MCV RBC AUTO: 88.1 FL (ref 81.4–97.8)
MCV RBC AUTO: 89.2 FL (ref 81.4–97.8)
MICRO URNS: ABNORMAL
MONOCYTES # BLD AUTO: 0.58 K/UL (ref 0–0.85)
MONOCYTES # BLD AUTO: 0.92 K/UL (ref 0–0.85)
MONOCYTES NFR BLD AUTO: 4.1 % (ref 0–13.4)
MONOCYTES NFR BLD AUTO: 5.8 % (ref 0–13.4)
NEUTROPHILS # BLD AUTO: 12.33 K/UL (ref 2–7.15)
NEUTROPHILS # BLD AUTO: 12.62 K/UL (ref 2–7.15)
NEUTROPHILS NFR BLD: 79.1 % (ref 44–72)
NEUTROPHILS NFR BLD: 87.5 % (ref 44–72)
NITRITE UR QL STRIP.AUTO: NEGATIVE
NRBC # BLD AUTO: 0 K/UL
NRBC # BLD AUTO: 0 K/UL
NRBC BLD-RTO: 0 /100 WBC
NRBC BLD-RTO: 0 /100 WBC
PH UR STRIP.AUTO: 7.5 [PH] (ref 5–8)
PLATELET # BLD AUTO: 289 K/UL (ref 164–446)
PLATELET # BLD AUTO: 349 K/UL (ref 164–446)
PMV BLD AUTO: 8.7 FL (ref 9–12.9)
PMV BLD AUTO: 8.8 FL (ref 9–12.9)
POTASSIUM SERPL-SCNC: 3.4 MMOL/L (ref 3.6–5.5)
POTASSIUM SERPL-SCNC: 3.7 MMOL/L (ref 3.6–5.5)
POTASSIUM SERPL-SCNC: 4.2 MMOL/L (ref 3.6–5.5)
PROT SERPL-MCNC: 8.6 G/DL (ref 6–8.2)
PROT UR QL STRIP: NEGATIVE MG/DL
RBC # BLD AUTO: 4.63 M/UL (ref 4.2–5.4)
RBC # BLD AUTO: 5.48 M/UL (ref 4.2–5.4)
RBC UR QL AUTO: NEGATIVE
SODIUM SERPL-SCNC: 137 MMOL/L (ref 135–145)
SODIUM SERPL-SCNC: 138 MMOL/L (ref 135–145)
SODIUM SERPL-SCNC: 138 MMOL/L (ref 135–145)
SP GR UR STRIP.AUTO: 1.02
TROPONIN T SERPL-MCNC: <6 NG/L (ref 6–19)
UROBILINOGEN UR STRIP.AUTO-MCNC: 0.2 MG/DL
WBC # BLD AUTO: 14.1 K/UL (ref 4.8–10.8)
WBC # BLD AUTO: 16 K/UL (ref 4.8–10.8)

## 2022-08-27 PROCEDURE — 80048 BASIC METABOLIC PNL TOTAL CA: CPT

## 2022-08-27 PROCEDURE — 85025 COMPLETE CBC W/AUTO DIFF WBC: CPT

## 2022-08-27 PROCEDURE — 96374 THER/PROPH/DIAG INJ IV PUSH: CPT

## 2022-08-27 PROCEDURE — 81003 URINALYSIS AUTO W/O SCOPE: CPT

## 2022-08-27 PROCEDURE — 84703 CHORIONIC GONADOTROPIN ASSAY: CPT

## 2022-08-27 PROCEDURE — 93005 ELECTROCARDIOGRAM TRACING: CPT

## 2022-08-27 PROCEDURE — 84484 ASSAY OF TROPONIN QUANT: CPT

## 2022-08-27 PROCEDURE — 36415 COLL VENOUS BLD VENIPUNCTURE: CPT

## 2022-08-27 PROCEDURE — 700111 HCHG RX REV CODE 636 W/ 250 OVERRIDE (IP)

## 2022-08-27 PROCEDURE — 83690 ASSAY OF LIPASE: CPT

## 2022-08-27 PROCEDURE — 80053 COMPREHEN METABOLIC PANEL: CPT

## 2022-08-27 PROCEDURE — 96375 TX/PRO/DX INJ NEW DRUG ADDON: CPT

## 2022-08-27 PROCEDURE — 99285 EMERGENCY DEPT VISIT HI MDM: CPT

## 2022-08-27 PROCEDURE — 93005 ELECTROCARDIOGRAM TRACING: CPT | Performed by: EMERGENCY MEDICINE

## 2022-08-27 PROCEDURE — 700105 HCHG RX REV CODE 258: Performed by: EMERGENCY MEDICINE

## 2022-08-27 PROCEDURE — 700111 HCHG RX REV CODE 636 W/ 250 OVERRIDE (IP): Performed by: EMERGENCY MEDICINE

## 2022-08-27 RX ORDER — MORPHINE SULFATE 4 MG/ML
4 INJECTION INTRAVENOUS ONCE
Status: COMPLETED | OUTPATIENT
Start: 2022-08-27 | End: 2022-08-27

## 2022-08-27 RX ORDER — SODIUM CHLORIDE, SODIUM LACTATE, POTASSIUM CHLORIDE, CALCIUM CHLORIDE 600; 310; 30; 20 MG/100ML; MG/100ML; MG/100ML; MG/100ML
1000 INJECTION, SOLUTION INTRAVENOUS ONCE
Status: COMPLETED | OUTPATIENT
Start: 2022-08-27 | End: 2022-08-27

## 2022-08-27 RX ORDER — ONDANSETRON 2 MG/ML
4 INJECTION INTRAMUSCULAR; INTRAVENOUS ONCE
Status: COMPLETED | OUTPATIENT
Start: 2022-08-27 | End: 2022-08-27

## 2022-08-27 RX ORDER — HALOPERIDOL 5 MG/ML
5 INJECTION INTRAMUSCULAR ONCE
Status: COMPLETED | OUTPATIENT
Start: 2022-08-27 | End: 2022-08-27

## 2022-08-27 RX ORDER — SODIUM CHLORIDE 9 MG/ML
1000 INJECTION, SOLUTION INTRAVENOUS ONCE
Status: COMPLETED | OUTPATIENT
Start: 2022-08-27 | End: 2022-08-27

## 2022-08-27 RX ORDER — ONDANSETRON 4 MG/1
4 TABLET, ORALLY DISINTEGRATING ORAL EVERY 6 HOURS PRN
Qty: 10 TABLET | Refills: 0 | Status: SHIPPED | OUTPATIENT
Start: 2022-08-27 | End: 2022-09-01

## 2022-08-27 RX ADMIN — HALOPERIDOL LACTATE 5 MG: 5 INJECTION, SOLUTION INTRAMUSCULAR at 08:57

## 2022-08-27 RX ADMIN — SODIUM CHLORIDE 1000 ML: 9 INJECTION, SOLUTION INTRAVENOUS at 06:48

## 2022-08-27 RX ADMIN — MORPHINE SULFATE 4 MG: 4 INJECTION INTRAVENOUS at 05:46

## 2022-08-27 RX ADMIN — SODIUM CHLORIDE, POTASSIUM CHLORIDE, SODIUM LACTATE AND CALCIUM CHLORIDE 1000 ML: 600; 310; 30; 20 INJECTION, SOLUTION INTRAVENOUS at 05:37

## 2022-08-27 RX ADMIN — ONDANSETRON 4 MG: 2 INJECTION INTRAMUSCULAR; INTRAVENOUS at 05:33

## 2022-08-27 ASSESSMENT — ENCOUNTER SYMPTOMS
DIARRHEA: 0
ABDOMINAL PAIN: 1
VOMITING: 1
NAUSEA: 1
FEVER: 0
HEADACHES: 0
COUGH: 0
CONSTIPATION: 0
BACK PAIN: 0
SHORTNESS OF BREATH: 1

## 2022-08-27 ASSESSMENT — PAIN DESCRIPTION - DESCRIPTORS: DESCRIPTORS: TENDER;MISERABLE

## 2022-08-27 ASSESSMENT — FIBROSIS 4 INDEX: FIB4 SCORE: 0.71

## 2022-08-27 NOTE — DISCHARGE INSTRUCTIONS
Be sure to stay hydrated over the next few days.  Trial of Zofran for the future in an attempt to troubleshoot symptoms at home.  
BIBA c/o nausea and vomiting x 1 day

## 2022-08-27 NOTE — ED NOTES
Patient remains unable to produce a urine sample at this time. Blood collected and sent to lab. Fluids started per MAR

## 2022-08-27 NOTE — ED NOTES
Attempted to obtain urine sample, patient unable to produce sample at this time.    complains of pain/discomfort

## 2022-08-27 NOTE — ED TRIAGE NOTES
Chief Complaint   Patient presents with    Abdominal Pain     Generalized for a few days, associated s/s of N/V. Recently been sick with what pt thinks was a cold    Shortness of Breath     Unable to catch her breath. RR 33, lung sounds clear. Used inhaler at home w/out relief    Chest Pain     For a few days along w/ abd pain, N,V,SOB. Not vaccinated for COVD     Pt states this has happened before and had prior admission to the hospital r/t  Actively vomiting in triage, lung sounds clear in all fields  +N/V/SOB/increased WOB

## 2022-08-27 NOTE — ED PROVIDER NOTES
"ED Provider Note    ED Provider Note    Primary care provider: Pcp Pt States None  Means of arrival: POV  History obtained from: patient  History limited by: None    CHIEF COMPLAINT  Chief Complaint   Patient presents with    Abdominal Pain     Generalized for a few days, associated s/s of N/V. Recently been sick with what pt thinks was a cold    Shortness of Breath     Unable to catch her breath. RR 33, lung sounds clear. Used inhaler at home w/out relief    Chest Pain     For a few days along w/ abd pain, N,V,SOB. Not vaccinated for COVD       HPI  Luba Deshpande is a 31 y.o. female who presents to the Emergency Department with a chief complaint of abdominal pain, nausea and vomiting.  Patient is also complaining of chest pain that started after vomiting.  She reports that she has been using her inhaler more at home.  Symptoms started a few days ago.  She states her son came home from school at the time, \"not feeling well\" he had a sore throat, headache and nausea.  Then her partner, developed similar symptoms as did she but hers progressed into the symptoms she is presenting with today.  No fever.  She has had no blood in her vomitus or stool.  Other than asthma she denies any other past medical history.  No past surgical history.  She denies the possibility of pregnancy as she is on birth control pills.  No allergies.  No family history of any significance.  She does not smoke or drink.  She uses marijuana occasionally.  She does report a history of a pulmonary nodule.  She was made aware of this at her last ED encounter about a year ago.    REVIEW OF SYSTEMS  Review of Systems   Constitutional:  Negative for fever.   HENT:  Negative for congestion.    Respiratory:  Positive for shortness of breath. Negative for cough.    Cardiovascular:  Positive for chest pain.   Gastrointestinal:  Positive for abdominal pain, nausea and vomiting. Negative for constipation and diarrhea.   Genitourinary:  Negative for " "dysuria and urgency.   Musculoskeletal:  Negative for back pain.   Neurological:  Negative for headaches.   All other systems reviewed and are negative.    PAST MEDICAL HISTORY   has a past medical history of Headache, classical migraine (2006).    SURGICAL HISTORY  patient denies any surgical history    SOCIAL HISTORY  Social History     Tobacco Use    Smoking status: Never    Smokeless tobacco: Never   Substance Use Topics    Alcohol use: No     Comment: very occasional     Drug use: No      Social History     Substance and Sexual Activity   Drug Use No       FAMILY HISTORY  Family History   Problem Relation Age of Onset    Cancer Mother 30        ovarian     Diabetes Mother     Diabetes Maternal Grandfather     Diabetes Paternal Grandmother     Diabetes Maternal Grandmother     Diabetes Paternal Grandfather        CURRENT MEDICATIONS  Home Medications       Reviewed by Rita Velasquez R.N. (Registered Nurse) on 08/27/22 at 0416  Med List Status: Partial     Medication Last Dose Status   albuterol 108 (90 Base) MCG/ACT Aero Soln inhalation aerosol  Active   drospirenone-ethinyl estradiol (ZEUS) 3-0.03 MG per tablet  Active   esomeprazole (NEXIUM) 20 MG capsule  Active   omeprazole (PRILOSEC) 20 MG delayed-release capsule  Active   Phenylephrine-Acetaminophen 5-325 MG Tab  Active   promethazine (PHENERGAN) 25 MG Tab  Active                    ALLERGIES  No Known Allergies    PHYSICAL EXAM  VITAL SIGNS: /79   Pulse 77   Temp 36.4 °C (97.5 °F) (Temporal)   Resp 18   Ht 1.702 m (5' 7\")   Wt 61.3 kg (135 lb 2.3 oz)   SpO2 96% Comment: Room air  BMI 21.17 kg/m²   Vitals reviewed.  Constitutional: Patient is oriented to person, place, and time. Appears well-developed and well-nourished. Mild distress.    Head: Normocephalic and atraumatic.   Ears: Normal external ears bilaterally.   Mouth/Throat: Oropharynx is clear and moist.  Eyes: Conjunctivae are normal. Pupils are equal and round.  Neck: Normal " range of motion. Neck supple.  Cardiovascular: Normal rate, regular rhythm and normal heart sounds. Normal peripheral pulses.  Pulmonary/Chest: Effort normal and breath sounds normal. No respiratory distress, no wheezes, rhonchi, or rales. No chest wall tenderness.  Abdominal: Soft. Bowel sounds are normal. There is epigastric tenderness. No rebound or guarding, or peritoneal signs. No CVA tenderness.  Musculoskeletal: No edema and no tenderness.   Neurological: No focal deficits.   Skin: Skin is warm and dry. No erythema. No pallor.   Psychiatric: Patient has a normal mood and affect.     LABS  Results for orders placed or performed during the hospital encounter of 08/27/22   CBC WITH DIFFERENTIAL   Result Value Ref Range    WBC 16.0 (H) 4.8 - 10.8 K/uL    RBC 5.48 (H) 4.20 - 5.40 M/uL    Hemoglobin 17.7 (H) 12.0 - 16.0 g/dL    Hematocrit 48.3 (H) 37.0 - 47.0 %    MCV 88.1 81.4 - 97.8 fL    MCH 32.3 27.0 - 33.0 pg    MCHC 36.6 (H) 33.6 - 35.0 g/dL    RDW 40.1 35.9 - 50.0 fL    Platelet Count 349 164 - 446 K/uL    MPV 8.7 (L) 9.0 - 12.9 fL    Neutrophils-Polys 79.10 (H) 44.00 - 72.00 %    Lymphocytes 14.00 (L) 22.00 - 41.00 %    Monocytes 5.80 0.00 - 13.40 %    Eosinophils 0.30 0.00 - 6.90 %    Basophils 0.40 0.00 - 1.80 %    Immature Granulocytes 0.40 0.00 - 0.90 %    Nucleated RBC 0.00 /100 WBC    Neutrophils (Absolute) 12.62 (H) 2.00 - 7.15 K/uL    Lymphs (Absolute) 2.24 1.00 - 4.80 K/uL    Monos (Absolute) 0.92 (H) 0.00 - 0.85 K/uL    Eos (Absolute) 0.04 0.00 - 0.51 K/uL    Baso (Absolute) 0.07 0.00 - 0.12 K/uL    Immature Granulocytes (abs) 0.07 0.00 - 0.11 K/uL    NRBC (Absolute) 0.00 K/uL   COMP METABOLIC PANEL   Result Value Ref Range    Sodium 138 135 - 145 mmol/L    Potassium 3.4 (L) 3.6 - 5.5 mmol/L    Chloride 102 96 - 112 mmol/L    Co2 16 (L) 20 - 33 mmol/L    Anion Gap 20.0 (H) 7.0 - 16.0    Glucose 117 (H) 65 - 99 mg/dL    Bun 18 8 - 22 mg/dL    Creatinine 0.98 0.50 - 1.40 mg/dL    Calcium 9.8 8.5 -  10.5 mg/dL    AST(SGOT) 34 12 - 45 U/L    ALT(SGPT) 42 2 - 50 U/L    Alkaline Phosphatase 75 30 - 99 U/L    Total Bilirubin 0.5 0.1 - 1.5 mg/dL    Albumin 5.2 (H) 3.2 - 4.9 g/dL    Total Protein 8.6 (H) 6.0 - 8.2 g/dL    Globulin 3.4 1.9 - 3.5 g/dL    A-G Ratio 1.5 g/dL   LIPASE   Result Value Ref Range    Lipase 41 11 - 82 U/L   HCG QUAL SERUM   Result Value Ref Range    Beta-Hcg Qualitative Serum Negative Negative   URINALYSIS,CULTURE IF INDICATED    Specimen: Urine   Result Value Ref Range    Color Yellow     Character Clear     Specific Gravity 1.018 <1.035    Ph 7.5 5.0 - 8.0    Glucose Negative Negative mg/dL    Ketones 15 (A) Negative mg/dL    Protein Negative Negative mg/dL    Bilirubin Negative Negative    Urobilinogen, Urine 0.2 Negative    Nitrite Negative Negative    Leukocyte Esterase Negative Negative    Occult Blood Negative Negative    Micro Urine Req see below    ESTIMATED GFR   Result Value Ref Range    GFR (CKD-EPI) 78 >60 mL/min/1.73 m 2   TROPONIN   Result Value Ref Range    Troponin T <6 6 - 19 ng/L   CBC WITH DIFFERENTIAL   Result Value Ref Range    WBC 14.1 (H) 4.8 - 10.8 K/uL    RBC 4.63 4.20 - 5.40 M/uL    Hemoglobin 14.8 12.0 - 16.0 g/dL    Hematocrit 41.3 37.0 - 47.0 %    MCV 89.2 81.4 - 97.8 fL    MCH 32.0 27.0 - 33.0 pg    MCHC 35.8 (H) 33.6 - 35.0 g/dL    RDW 40.1 35.9 - 50.0 fL    Platelet Count 289 164 - 446 K/uL    MPV 8.8 (L) 9.0 - 12.9 fL    Neutrophils-Polys 87.50 (H) 44.00 - 72.00 %    Lymphocytes 7.70 (L) 22.00 - 41.00 %    Monocytes 4.10 0.00 - 13.40 %    Eosinophils 0.00 0.00 - 6.90 %    Basophils 0.20 0.00 - 1.80 %    Immature Granulocytes 0.50 0.00 - 0.90 %    Nucleated RBC 0.00 /100 WBC    Neutrophils (Absolute) 12.33 (H) 2.00 - 7.15 K/uL    Lymphs (Absolute) 1.08 1.00 - 4.80 K/uL    Monos (Absolute) 0.58 0.00 - 0.85 K/uL    Eos (Absolute) 0.00 0.00 - 0.51 K/uL    Baso (Absolute) 0.03 0.00 - 0.12 K/uL    Immature Granulocytes (abs) 0.07 0.00 - 0.11 K/uL    NRBC (Absolute)  0.00 K/uL   Basic Metabolic Panel   Result Value Ref Range    Sodium 138 135 - 145 mmol/L    Potassium 3.7 3.6 - 5.5 mmol/L    Chloride 105 96 - 112 mmol/L    Co2 18 (L) 20 - 33 mmol/L    Glucose 109 (H) 65 - 99 mg/dL    Bun 17 8 - 22 mg/dL    Creatinine 0.85 0.50 - 1.40 mg/dL    Calcium 8.2 (L) 8.5 - 10.5 mg/dL    Anion Gap 15.0 7.0 - 16.0   Basic Metabolic Panel   Result Value Ref Range    Sodium 137 135 - 145 mmol/L    Potassium 4.2 3.6 - 5.5 mmol/L    Chloride 102 96 - 112 mmol/L    Co2 18 (L) 20 - 33 mmol/L    Glucose 109 (H) 65 - 99 mg/dL    Bun 17 8 - 22 mg/dL    Creatinine 0.87 0.50 - 1.40 mg/dL    Calcium 8.9 8.5 - 10.5 mg/dL    Anion Gap 17.0 (H) 7.0 - 16.0   ESTIMATED GFR   Result Value Ref Range    GFR (CKD-EPI) 90 >60 mL/min/1.73 m 2   ESTIMATED GFR   Result Value Ref Range    GFR (CKD-EPI) 93 >60 mL/min/1.73 m 2   EKG (NOW)   Result Value Ref Range    Report       [facility]    Test Date:  2022  Pt Name:    ETHAN KILGORE                Department: ER  MRN:        [id number]                      Room:       [facility]  Gender:     Female                       Technician: [id number]  :        1990                   Requested By:ER TRIAGE PROTOCOL  Order #:    [id number]                    Reading MD: DARLYN KIRKLAND DO    Measurements  Intervals                                Axis  Rate:       81                           P:          43  AR:         88                           QRS:        80  QRSD:       93                           T:          55  QT:         422  QTc:        490    Interpretive Statements  Sinus rhythm  Short AR interval  Borderline prolonged QT interval  Compared to ECG 2021 19:08:48  ST (T wave) deviation no longer present  Electronically Signed On 2022 5:32:05 PDT by DARLYN KIKRLAND DO         All labs reviewed by me.    EKG Interpretation  Interpreted by me    COURSE & MEDICAL DECISION MAKING  Pertinent Labs & Imaging studies reviewed.  "(See chart for details)    Obtained and reviewed past medical records. ED visit 8/2021 for CP and N/V. ED Visit 10/2020 for AP and N/V.     5:01 AM - Patient seen and examined at bedside.  This is a overall healthy 31-year-old female.  She presents with nausea vomiting upper abdominal pain, chest pain.  Symptoms started a few days ago.  She has reassuring vital signs.  She does not report a fever she is afebrile here in the department.  Lungs are clear.  There is no increased work of breathing.  Patient is holding an emesis bag.  There is a small amounts bile in it.  She is tearful and anxious.  An IV has been established.  She will be treated with antiemetics, pain medication and IV fluids.    Data reviewed.  Patient has an elevated white blood cell count of 16 which she previously has demonstrated on the prior episodes when she has presented to the ED with similar symptoms, previously 18 in August 2021 and 15 in October 2020.  Her lipase is normal.  Pregnancy test is negative.  Her anion gap is 20 and her bicarb is 16.  Potassium slightly low 3.4.    Patient's labs reviewed after IV fluid resuscitation.  They are improved.  Blood cell count decreased to 14.  Curiously, she has had to repeat BMPs although I only ordered 1.  It was usually done at 5:53 AM, repeated at 6:49 AM, this showed an improvement in her bicarb of 18, anion gap decreased to 17.  It was a been repeated again at 7:41 AM bicarb unchanged, 18 down from original 16, anion gap 15.    Patient seen ambulating to the bathroom.  She states she tried to drink water and it came up.  She still complaining of pain in her esophagus and chest.  We discussed lab results which are improved.  We will trial Haldol.    10:39 AM patient's reevaluated the bedside.  She looks much better and reports feeling \"much better\" this is after treatment with Haldol.  She does not report that she has significant symptoms between episodes.  She has had in total.  She will be " discharged home with a course of Zofran perhaps, she can try to troubleshoot it at home.  She is given some information about cyclic vomiting syndrome.  At this time, I feel she can safely be discharged home and she is in agreement.  She is much improved.  She is discharged in stable condition.    FINAL IMPRESSION  1. Chest pain, unspecified type    2. Abdominal pain, unspecified abdominal location    3. Non-intractable vomiting with nausea, unspecified vomiting type

## 2022-08-29 ENCOUNTER — HOSPITAL ENCOUNTER (EMERGENCY)
Facility: MEDICAL CENTER | Age: 32
End: 2022-08-30

## 2022-08-29 VITALS
BODY MASS INDEX: 21.45 KG/M2 | TEMPERATURE: 98.5 F | OXYGEN SATURATION: 97 % | DIASTOLIC BLOOD PRESSURE: 101 MMHG | HEIGHT: 67 IN | WEIGHT: 136.69 LBS | RESPIRATION RATE: 16 BRPM | HEART RATE: 97 BPM | SYSTOLIC BLOOD PRESSURE: 158 MMHG

## 2022-08-29 PROCEDURE — 302449 STATCHG TRIAGE ONLY (STATISTIC)

## 2022-08-29 ASSESSMENT — FIBROSIS 4 INDEX: FIB4 SCORE: 0.58

## 2022-08-30 ENCOUNTER — HOSPITAL ENCOUNTER (EMERGENCY)
Facility: MEDICAL CENTER | Age: 32
End: 2022-08-30
Attending: STUDENT IN AN ORGANIZED HEALTH CARE EDUCATION/TRAINING PROGRAM
Payer: OTHER MISCELLANEOUS

## 2022-08-30 VITALS
BODY MASS INDEX: 21.45 KG/M2 | WEIGHT: 136.69 LBS | TEMPERATURE: 98.4 F | HEIGHT: 67 IN | RESPIRATION RATE: 18 BRPM | HEART RATE: 97 BPM | OXYGEN SATURATION: 96 % | DIASTOLIC BLOOD PRESSURE: 89 MMHG | SYSTOLIC BLOOD PRESSURE: 132 MMHG

## 2022-08-30 DIAGNOSIS — U07.1 COVID-19: ICD-10-CM

## 2022-08-30 DIAGNOSIS — R11.2 NAUSEA AND VOMITING, INTRACTABILITY OF VOMITING NOT SPECIFIED, UNSPECIFIED VOMITING TYPE: ICD-10-CM

## 2022-08-30 LAB
ALBUMIN SERPL BCP-MCNC: 4.7 G/DL (ref 3.2–4.9)
ALBUMIN SERPL BCP-MCNC: 4.8 G/DL (ref 3.2–4.9)
ALBUMIN/GLOB SERPL: 1.3 G/DL
ALBUMIN/GLOB SERPL: 1.4 G/DL
ALP SERPL-CCNC: 69 U/L (ref 30–99)
ALP SERPL-CCNC: 73 U/L (ref 30–99)
ALT SERPL-CCNC: 41 U/L (ref 2–50)
ALT SERPL-CCNC: 44 U/L (ref 2–50)
ANION GAP SERPL CALC-SCNC: 18 MMOL/L (ref 7–16)
ANION GAP SERPL CALC-SCNC: 20 MMOL/L (ref 7–16)
APPEARANCE UR: ABNORMAL
AST SERPL-CCNC: 28 U/L (ref 12–45)
AST SERPL-CCNC: 31 U/L (ref 12–45)
BACTERIA #/AREA URNS HPF: ABNORMAL /HPF
BASOPHILS # BLD AUTO: 0.4 % (ref 0–1.8)
BASOPHILS # BLD AUTO: 0.5 % (ref 0–1.8)
BASOPHILS # BLD: 0.05 K/UL (ref 0–0.12)
BASOPHILS # BLD: 0.05 K/UL (ref 0–0.12)
BILIRUB SERPL-MCNC: 0.8 MG/DL (ref 0.1–1.5)
BILIRUB SERPL-MCNC: 0.9 MG/DL (ref 0.1–1.5)
BILIRUB UR QL STRIP.AUTO: ABNORMAL
BUN SERPL-MCNC: 16 MG/DL (ref 8–22)
BUN SERPL-MCNC: 18 MG/DL (ref 8–22)
CALCIUM SERPL-MCNC: 9.2 MG/DL (ref 8.4–10.2)
CALCIUM SERPL-MCNC: 9.5 MG/DL (ref 8.5–10.5)
CHLORIDE SERPL-SCNC: 100 MMOL/L (ref 96–112)
CHLORIDE SERPL-SCNC: 102 MMOL/L (ref 96–112)
CO2 SERPL-SCNC: 18 MMOL/L (ref 20–33)
CO2 SERPL-SCNC: 19 MMOL/L (ref 20–33)
COLOR UR: YELLOW
CREAT SERPL-MCNC: 0.87 MG/DL (ref 0.5–1.4)
CREAT SERPL-MCNC: 0.91 MG/DL (ref 0.5–1.4)
EOSINOPHIL # BLD AUTO: 0.04 K/UL (ref 0–0.51)
EOSINOPHIL # BLD AUTO: 0.07 K/UL (ref 0–0.51)
EOSINOPHIL NFR BLD: 0.3 % (ref 0–6.9)
EOSINOPHIL NFR BLD: 0.7 % (ref 0–6.9)
EPI CELLS #/AREA URNS HPF: ABNORMAL /HPF
ERYTHROCYTE [DISTWIDTH] IN BLOOD BY AUTOMATED COUNT: 37.2 FL (ref 35.9–50)
ERYTHROCYTE [DISTWIDTH] IN BLOOD BY AUTOMATED COUNT: 37.5 FL (ref 35.9–50)
FLUAV RNA SPEC QL NAA+PROBE: NEGATIVE
FLUBV RNA SPEC QL NAA+PROBE: NEGATIVE
GFR SERPLBLD CREATININE-BSD FMLA CKD-EPI: 86 ML/MIN/1.73 M 2
GFR SERPLBLD CREATININE-BSD FMLA CKD-EPI: 90 ML/MIN/1.73 M 2
GLOBULIN SER CALC-MCNC: 3.4 G/DL (ref 1.9–3.5)
GLOBULIN SER CALC-MCNC: 3.6 G/DL (ref 1.9–3.5)
GLUCOSE SERPL-MCNC: 106 MG/DL (ref 65–99)
GLUCOSE SERPL-MCNC: 106 MG/DL (ref 65–99)
GLUCOSE UR STRIP.AUTO-MCNC: NEGATIVE MG/DL
HCG SERPL QL: NEGATIVE
HCG UR QL: NEGATIVE
HCT VFR BLD AUTO: 47.2 % (ref 37–47)
HCT VFR BLD AUTO: 47.4 % (ref 37–47)
HGB BLD-MCNC: 16.9 G/DL (ref 12–16)
HGB BLD-MCNC: 17.4 G/DL (ref 12–16)
IMM GRANULOCYTES # BLD AUTO: 0.05 K/UL (ref 0–0.11)
IMM GRANULOCYTES # BLD AUTO: 0.07 K/UL (ref 0–0.11)
IMM GRANULOCYTES NFR BLD AUTO: 0.5 % (ref 0–0.9)
IMM GRANULOCYTES NFR BLD AUTO: 0.6 % (ref 0–0.9)
KETONES UR STRIP.AUTO-MCNC: >=80 MG/DL
LEUKOCYTE ESTERASE UR QL STRIP.AUTO: NEGATIVE
LIPASE SERPL-CCNC: 32 U/L (ref 7–58)
LIPASE SERPL-CCNC: 39 U/L (ref 11–82)
LYMPHOCYTES # BLD AUTO: 2.71 K/UL (ref 1–4.8)
LYMPHOCYTES # BLD AUTO: 2.77 K/UL (ref 1–4.8)
LYMPHOCYTES NFR BLD: 21.9 % (ref 22–41)
LYMPHOCYTES NFR BLD: 27.8 % (ref 22–41)
MCH RBC QN AUTO: 31.2 PG (ref 27–33)
MCH RBC QN AUTO: 31.6 PG (ref 27–33)
MCHC RBC AUTO-ENTMCNC: 35.8 G/DL (ref 33.6–35)
MCHC RBC AUTO-ENTMCNC: 36.7 G/DL (ref 33.6–35)
MCV RBC AUTO: 86.2 FL (ref 81.4–97.8)
MCV RBC AUTO: 87.2 FL (ref 81.4–97.8)
MICRO URNS: ABNORMAL
MONOCYTES # BLD AUTO: 0.75 K/UL (ref 0–0.85)
MONOCYTES # BLD AUTO: 0.99 K/UL (ref 0–0.85)
MONOCYTES NFR BLD AUTO: 7.5 % (ref 0–13.4)
MONOCYTES NFR BLD AUTO: 8 % (ref 0–13.4)
MUCOUS THREADS #/AREA URNS HPF: ABNORMAL /HPF
NEUTROPHILS # BLD AUTO: 6.28 K/UL (ref 2–7.15)
NEUTROPHILS # BLD AUTO: 8.49 K/UL (ref 2–7.15)
NEUTROPHILS NFR BLD: 63 % (ref 44–72)
NEUTROPHILS NFR BLD: 68.8 % (ref 44–72)
NITRITE UR QL STRIP.AUTO: NEGATIVE
NRBC # BLD AUTO: 0 K/UL
NRBC # BLD AUTO: 0 K/UL
NRBC BLD-RTO: 0 /100 WBC
NRBC BLD-RTO: 0 /100 WBC
PH UR STRIP.AUTO: 7 [PH] (ref 5–8)
PLATELET # BLD AUTO: 343 K/UL (ref 164–446)
PLATELET # BLD AUTO: 351 K/UL (ref 164–446)
PMV BLD AUTO: 8.6 FL (ref 9–12.9)
PMV BLD AUTO: 8.7 FL (ref 9–12.9)
POTASSIUM SERPL-SCNC: 3.4 MMOL/L (ref 3.6–5.5)
POTASSIUM SERPL-SCNC: 3.5 MMOL/L (ref 3.6–5.5)
PROT SERPL-MCNC: 8.2 G/DL (ref 6–8.2)
PROT SERPL-MCNC: 8.3 G/DL (ref 6–8.2)
PROT UR QL STRIP: 30 MG/DL
RBC # BLD AUTO: 5.41 M/UL (ref 4.2–5.4)
RBC # BLD AUTO: 5.5 M/UL (ref 4.2–5.4)
RBC # URNS HPF: ABNORMAL /HPF
RBC UR QL AUTO: NEGATIVE
RSV RNA SPEC QL NAA+PROBE: NEGATIVE
SARS-COV-2 RNA RESP QL NAA+PROBE: DETECTED
SODIUM SERPL-SCNC: 138 MMOL/L (ref 135–145)
SODIUM SERPL-SCNC: 139 MMOL/L (ref 135–145)
SP GR UR STRIP.AUTO: 1.02
SPECIMEN SOURCE: ABNORMAL
UNIDENT CRYS URNS QL MICRO: ABNORMAL /HPF
WBC # BLD AUTO: 10 K/UL (ref 4.8–10.8)
WBC # BLD AUTO: 12.4 K/UL (ref 4.8–10.8)
WBC #/AREA URNS HPF: ABNORMAL /HPF

## 2022-08-30 PROCEDURE — 0241U HCHG SARS-COV-2 COVID-19 NFCT DS RESP RNA 4 TRGT MIC: CPT

## 2022-08-30 PROCEDURE — C9803 HOPD COVID-19 SPEC COLLECT: HCPCS | Performed by: STUDENT IN AN ORGANIZED HEALTH CARE EDUCATION/TRAINING PROGRAM

## 2022-08-30 PROCEDURE — 700105 HCHG RX REV CODE 258: Performed by: STUDENT IN AN ORGANIZED HEALTH CARE EDUCATION/TRAINING PROGRAM

## 2022-08-30 PROCEDURE — 96375 TX/PRO/DX INJ NEW DRUG ADDON: CPT

## 2022-08-30 PROCEDURE — 99285 EMERGENCY DEPT VISIT HI MDM: CPT

## 2022-08-30 PROCEDURE — C9113 INJ PANTOPRAZOLE SODIUM, VIA: HCPCS | Performed by: STUDENT IN AN ORGANIZED HEALTH CARE EDUCATION/TRAINING PROGRAM

## 2022-08-30 PROCEDURE — 36415 COLL VENOUS BLD VENIPUNCTURE: CPT

## 2022-08-30 PROCEDURE — 96365 THER/PROPH/DIAG IV INF INIT: CPT

## 2022-08-30 PROCEDURE — 700111 HCHG RX REV CODE 636 W/ 250 OVERRIDE (IP): Performed by: STUDENT IN AN ORGANIZED HEALTH CARE EDUCATION/TRAINING PROGRAM

## 2022-08-30 PROCEDURE — 80053 COMPREHEN METABOLIC PANEL: CPT

## 2022-08-30 PROCEDURE — 81001 URINALYSIS AUTO W/SCOPE: CPT

## 2022-08-30 PROCEDURE — 85025 COMPLETE CBC W/AUTO DIFF WBC: CPT | Mod: 91

## 2022-08-30 PROCEDURE — 83690 ASSAY OF LIPASE: CPT

## 2022-08-30 PROCEDURE — 81025 URINE PREGNANCY TEST: CPT

## 2022-08-30 PROCEDURE — 83690 ASSAY OF LIPASE: CPT | Mod: 91

## 2022-08-30 PROCEDURE — 85025 COMPLETE CBC W/AUTO DIFF WBC: CPT

## 2022-08-30 PROCEDURE — 84703 CHORIONIC GONADOTROPIN ASSAY: CPT

## 2022-08-30 PROCEDURE — 80053 COMPREHEN METABOLIC PANEL: CPT | Mod: 91

## 2022-08-30 RX ORDER — PROCHLORPERAZINE EDISYLATE 5 MG/ML
10 INJECTION INTRAMUSCULAR; INTRAVENOUS ONCE
Status: COMPLETED | OUTPATIENT
Start: 2022-08-30 | End: 2022-08-30

## 2022-08-30 RX ORDER — POTASSIUM CHLORIDE 7.45 MG/ML
10 INJECTION INTRAVENOUS ONCE
Status: COMPLETED | OUTPATIENT
Start: 2022-08-30 | End: 2022-08-30

## 2022-08-30 RX ORDER — PANTOPRAZOLE SODIUM 40 MG/10ML
40 INJECTION, POWDER, LYOPHILIZED, FOR SOLUTION INTRAVENOUS ONCE
Status: COMPLETED | OUTPATIENT
Start: 2022-08-30 | End: 2022-08-30

## 2022-08-30 RX ORDER — SODIUM CHLORIDE, SODIUM LACTATE, POTASSIUM CHLORIDE, CALCIUM CHLORIDE 600; 310; 30; 20 MG/100ML; MG/100ML; MG/100ML; MG/100ML
1000 INJECTION, SOLUTION INTRAVENOUS ONCE
Status: COMPLETED | OUTPATIENT
Start: 2022-08-30 | End: 2022-08-30

## 2022-08-30 RX ORDER — SODIUM CHLORIDE 9 MG/ML
1000 INJECTION, SOLUTION INTRAVENOUS ONCE
Status: COMPLETED | OUTPATIENT
Start: 2022-08-30 | End: 2022-08-30

## 2022-08-30 RX ADMIN — SODIUM CHLORIDE 1000 ML: 9 INJECTION, SOLUTION INTRAVENOUS at 02:15

## 2022-08-30 RX ADMIN — FAMOTIDINE 20 MG: 10 INJECTION, SOLUTION INTRAVENOUS at 02:15

## 2022-08-30 RX ADMIN — PROCHLORPERAZINE EDISYLATE 10 MG: 5 INJECTION INTRAMUSCULAR; INTRAVENOUS at 02:15

## 2022-08-30 RX ADMIN — SODIUM CHLORIDE, POTASSIUM CHLORIDE, SODIUM LACTATE AND CALCIUM CHLORIDE 1000 ML: 600; 310; 30; 20 INJECTION, SOLUTION INTRAVENOUS at 03:15

## 2022-08-30 RX ADMIN — PANTOPRAZOLE SODIUM 40 MG: 40 INJECTION, POWDER, LYOPHILIZED, FOR SOLUTION INTRAVENOUS at 02:15

## 2022-08-30 RX ADMIN — POTASSIUM CHLORIDE 10 MEQ: 7.46 INJECTION, SOLUTION INTRAVENOUS at 03:15

## 2022-08-30 ASSESSMENT — ENCOUNTER SYMPTOMS
SORE THROAT: 0
COUGH: 0
LOSS OF CONSCIOUSNESS: 0
FEVER: 0
ABDOMINAL PAIN: 1
VOMITING: 1
NAUSEA: 1
HEADACHES: 0
FALLS: 0
BLURRED VISION: 0
NECK PAIN: 0
DOUBLE VISION: 0
CHILLS: 0
SHORTNESS OF BREATH: 0

## 2022-08-30 ASSESSMENT — FIBROSIS 4 INDEX: FIB4 SCORE: 0.43

## 2022-08-30 NOTE — ED NOTES
Rounded on patient regarding urine sample collection. Patient refused at this time. Education provided and patient verbally demonstrated understanding.

## 2022-08-30 NOTE — ED NOTES
Fluids started medicated as per MAR family at bedside  Pt unable to provided urine sample at this time ERP aware

## 2022-08-30 NOTE — ED PROVIDER NOTES
ED Provider Note    Chief Complaint:   Nausea vomiting    HPI:  Luba Deshpande is a very pleasant 31-year-old female who presents with epigastric abdominal pain, nausea or vomiting.  Patient reports upper respiratory symptoms starting last Wednesday.  Patient is not vaccinated against COVID-19.  Patient reports that her oral intake was decreased since Wednesday and has been persisting.  Patient is unable to tolerate oral intake at this time.  Patient has received Zofran and Phenergan at home without improvement.  Patient endorses epigastric burning which is worse with eating and drinking.  Patient also endorses dysuria and chills.    Review of Systems:  Review of Systems   Constitutional:  Negative for chills and fever.   HENT:  Negative for congestion and sore throat.    Eyes:  Negative for blurred vision and double vision.   Respiratory:  Negative for cough and shortness of breath.    Cardiovascular:  Negative for chest pain and leg swelling.   Gastrointestinal:  Positive for abdominal pain, nausea and vomiting.   Genitourinary:  Negative for dysuria and hematuria.   Musculoskeletal:  Negative for falls and neck pain.   Skin:  Negative for itching and rash.   Neurological:  Negative for loss of consciousness and headaches.     Family History:  Family History   Problem Relation Age of Onset    Cancer Mother 30        ovarian     Diabetes Mother     Diabetes Maternal Grandfather     Diabetes Paternal Grandmother     Diabetes Maternal Grandmother     Diabetes Paternal Grandfather        Past Medical History:   has a past medical history of Headache, classical migraine (2006).    Social History:  Social History     Tobacco Use    Smoking status: Never    Smokeless tobacco: Never   Substance and Sexual Activity    Alcohol use: No     Comment: very occasional     Drug use: No    Sexual activity: Yes     Birth control/protection: Pill       Surgical History:  patient denies any surgical history    Allergies:  No  "Known Allergies    Physical Exam:  Vital Signs: BP (!) 128/95   Pulse 97   Temp 36.9 °C (98.5 °F) (Temporal)   Resp 18   Ht 1.702 m (5' 7\")   Wt 62 kg (136 lb 11 oz)   LMP  (LMP Unknown) Comment: On birth control pills  SpO2 96%   BMI 21.41 kg/m²   Physical Exam  Vitals and nursing note reviewed.   Constitutional:       Comments: Patient is lying in bed supine, pleasant, conversant, speaking in complete sentences   HENT:      Head: Normocephalic and atraumatic.   Eyes:      Extraocular Movements: Extraocular movements intact.      Conjunctiva/sclera: Conjunctivae normal.      Pupils: Pupils are equal, round, and reactive to light.   Cardiovascular:      Pulses: Normal pulses.      Comments: HR 76  Pulmonary:      Effort: Pulmonary effort is normal. No respiratory distress.   Abdominal:      Comments: Abdomen is soft, epigastric abdominal tenderness to palpation, non-distended, non-rigid, no rebound, guarding, masses, no McBurney's point tenderness, no peritoneal signs, negative Rovsing sign, negative Holbrook sign.  No CVA tenderness to palpation.    Musculoskeletal:         General: No swelling. Normal range of motion.      Cervical back: Normal range of motion. No rigidity.   Skin:     General: Skin is warm and dry.      Capillary Refill: Capillary refill takes less than 2 seconds.   Neurological:      Mental Status: She is alert.       Medical records reviewed for continuity of care.     Results for orders placed or performed during the hospital encounter of 08/30/22   CBC WITH DIFFERENTIAL   Result Value Ref Range    WBC 12.4 (H) 4.8 - 10.8 K/uL    RBC 5.41 (H) 4.20 - 5.40 M/uL    Hemoglobin 16.9 (H) 12.0 - 16.0 g/dL    Hematocrit 47.2 (H) 37.0 - 47.0 %    MCV 87.2 81.4 - 97.8 fL    MCH 31.2 27.0 - 33.0 pg    MCHC 35.8 (H) 33.6 - 35.0 g/dL    RDW 37.5 35.9 - 50.0 fL    Platelet Count 343 164 - 446 K/uL    MPV 8.7 (L) 9.0 - 12.9 fL    Neutrophils-Polys 68.80 44.00 - 72.00 %    Lymphocytes 21.90 (L) 22.00 - " 41.00 %    Monocytes 8.00 0.00 - 13.40 %    Eosinophils 0.30 0.00 - 6.90 %    Basophils 0.40 0.00 - 1.80 %    Immature Granulocytes 0.60 0.00 - 0.90 %    Nucleated RBC 0.00 /100 WBC    Neutrophils (Absolute) 8.49 (H) 2.00 - 7.15 K/uL    Lymphs (Absolute) 2.71 1.00 - 4.80 K/uL    Monos (Absolute) 0.99 (H) 0.00 - 0.85 K/uL    Eos (Absolute) 0.04 0.00 - 0.51 K/uL    Baso (Absolute) 0.05 0.00 - 0.12 K/uL    Immature Granulocytes (abs) 0.07 0.00 - 0.11 K/uL    NRBC (Absolute) 0.00 K/uL   Comp Metabolic Panel   Result Value Ref Range    Sodium 138 135 - 145 mmol/L    Potassium 3.5 (L) 3.6 - 5.5 mmol/L    Chloride 102 96 - 112 mmol/L    Co2 18 (L) 20 - 33 mmol/L    Anion Gap 18.0 (H) 7.0 - 16.0    Glucose 106 (H) 65 - 99 mg/dL    Bun 18 8 - 22 mg/dL    Creatinine 0.87 0.50 - 1.40 mg/dL    Calcium 9.2 8.4 - 10.2 mg/dL    AST(SGOT) 28 12 - 45 U/L    ALT(SGPT) 41 2 - 50 U/L    Alkaline Phosphatase 73 30 - 99 U/L    Total Bilirubin 0.8 0.1 - 1.5 mg/dL    Albumin 4.7 3.2 - 4.9 g/dL    Total Protein 8.3 (H) 6.0 - 8.2 g/dL    Globulin 3.6 (H) 1.9 - 3.5 g/dL    A-G Ratio 1.3 g/dL   LIPASE   Result Value Ref Range    Lipase 32 7 - 58 U/L   COV-2, FLU A/B, AND RSV BY PCR (2-4 HOURS CEPHEID): Collect NP swab in VTM    Specimen: Respirate   Result Value Ref Range    Influenza virus A RNA Negative Negative    Influenza virus B, PCR Negative Negative    RSV, PCR Negative Negative    SARS-CoV-2 by PCR DETECTED (AA)     SARS-CoV-2 Source NP Swab    HCG QUALITATIVE UR   Result Value Ref Range    Beta-Hcg Urine Negative Negative   URINALYSIS    Specimen: Urine   Result Value Ref Range    Color Yellow     Character Hazy (A)     Specific Gravity 1.020 <1.035    Ph 7.0 5.0 - 8.0    Glucose Negative Negative mg/dL    Ketones >=80 (A) Negative mg/dL    Protein 30 (A) Negative mg/dL    Bilirubin Small (A) Negative    Nitrite Negative Negative    Leukocyte Esterase Negative Negative    Occult Blood Negative Negative    Micro Urine Req Microscopic     ESTIMATED GFR   Result Value Ref Range    GFR (CKD-EPI) 90 >60 mL/min/1.73 m 2   URINE MICROSCOPIC (W/UA)   Result Value Ref Range    WBC 2-5 /hpf    RBC 0-2 /hpf    Bacteria Few (A) None /hpf    Epithelial Cells Few Few /hpf    Mucous Threads Moderate /hpf    Urine Crystals Few Amorphous /hpf       Radiology:  No orders to display        MDM:  CBC to evaluate for acute anemia and leukocytosis.  CMP to evaluate for acute electrolyte abnormality, acute kidney injury, acute liver failure or dysfunction.  Lipase to evaluate for pancreatitis.  Urinalysis to evaluate for UTI.  Respiratory viral panel to evaluate for flu, COVID, RSV.  Protonix and famotidine for suspected gastritis following recurrent nausea vomiting.  Compazine for nausea vomiting.  IV fluids for suspected dehydration.  Disposition pending work-up.    Electronically signed by: Sathish Tucker M.D., 8/30/2022, 1:57 AM    Urinalysis unremarkable for UTI.  Respiratory viral panel demonstrates positive COVID-19.  CMP demonstrates evidence of acute dehydration, low bicarb, elevated anion gap, patient has been given 2 L IV fluids and is tolerating oral intake without difficulty.  Potassium has been repleted as well.  CBC demonstrates hemoconcentration consistent with acute dehydration.  Patient given quarantine precautions for COVID-19.    Repeat physical exam benign.  I doubt any serious emergency process at this time.  Patient and/or family, friends given strict return precautions and care instructions. They have demonstrated understanding of discharge instructions through teach back mechanism. Advised PCP follow-up in 1-2 days.  Patient/family/friend expresses understanding and agrees to plan.    This dictation has been created using voice recognition software. I am continuously working with the software to minimize the number of voice recognition errors and I have made every attempt to manually correct the errors within my dictation. However errors   related to this voice recognition software may still exist and should be interpreted within the appropriate context.     Electronically signed by: Sathish Tucker M.D., 8/30/2022 4:18 AM      Disposition:  Home    Final Impression:  1. Nausea and vomiting, intractability of vomiting not specified, unspecified vomiting type    2. COVID-19        Electronically signed by: Sathish Tucker M.D., 8/30/2022 4:18 AM

## 2022-08-30 NOTE — ED TRIAGE NOTES
"Chief Complaint   Patient presents with    N/V     Pt states she had been vomiting since Wednesday and has been unable to keep anything down. Pt was seen here on Saturday and was sent home with zofran that did not help.  She had a telemedicine appointment today and was prescribed promethazine and that did not help.     Sent by MD     The telemedicine doctors instructed pt to come into the ER if she could not keep anything down because she may need fluids and to be worked up for a viral GI bug.      Numbness     Pt has numbness in her fingers, toes and lips that started today.     Abdominal Pain     Tender with palpation. Describes pain as burning and cramping.      BP (!) 158/101   Pulse 97   Temp 36.9 °C (98.5 °F) (Temporal)   Resp 16   Ht 1.702 m (5' 7\")   Wt 62 kg (136 lb 11 oz)   SpO2 97%   BMI 21.41 kg/m²     Pt in WC from lobby. Abdominal pain protocol ordered.     Pt is alert and oriented, speaking in full sentences, follows commands and responds appropriately to questions. Resp are even and unlabored.      Pt placed in lobby. Pt educated on triage process. Pt encouraged to alert staff for any changes.     Patient and staff wearing appropriate PPE    "

## 2022-09-01 ENCOUNTER — HOSPITAL ENCOUNTER (EMERGENCY)
Facility: MEDICAL CENTER | Age: 32
End: 2022-09-01
Attending: EMERGENCY MEDICINE
Payer: OTHER MISCELLANEOUS

## 2022-09-01 VITALS
SYSTOLIC BLOOD PRESSURE: 141 MMHG | TEMPERATURE: 96.9 F | HEART RATE: 75 BPM | WEIGHT: 135.58 LBS | BODY MASS INDEX: 21.28 KG/M2 | DIASTOLIC BLOOD PRESSURE: 106 MMHG | RESPIRATION RATE: 16 BRPM | OXYGEN SATURATION: 99 % | HEIGHT: 67 IN

## 2022-09-01 DIAGNOSIS — U07.1 COVID: ICD-10-CM

## 2022-09-01 DIAGNOSIS — R10.13 EPIGASTRIC PAIN: ICD-10-CM

## 2022-09-01 DIAGNOSIS — E86.0 DEHYDRATION: ICD-10-CM

## 2022-09-01 DIAGNOSIS — K20.90 ESOPHAGITIS: ICD-10-CM

## 2022-09-01 DIAGNOSIS — R11.2 NON-INTRACTABLE VOMITING WITH NAUSEA, UNSPECIFIED VOMITING TYPE: ICD-10-CM

## 2022-09-01 LAB
ALBUMIN SERPL BCP-MCNC: 4.8 G/DL (ref 3.2–4.9)
ALBUMIN/GLOB SERPL: 1.5 G/DL
ALP SERPL-CCNC: 66 U/L (ref 30–99)
ALT SERPL-CCNC: 35 U/L (ref 2–50)
ANION GAP SERPL CALC-SCNC: 18 MMOL/L (ref 7–16)
AST SERPL-CCNC: 21 U/L (ref 12–45)
BASOPHILS # BLD AUTO: 0.3 % (ref 0–1.8)
BASOPHILS # BLD: 0.04 K/UL (ref 0–0.12)
BILIRUB SERPL-MCNC: 0.7 MG/DL (ref 0.1–1.5)
BUN SERPL-MCNC: 12 MG/DL (ref 8–22)
CALCIUM SERPL-MCNC: 9.7 MG/DL (ref 8.4–10.2)
CHLORIDE SERPL-SCNC: 99 MMOL/L (ref 96–112)
CO2 SERPL-SCNC: 19 MMOL/L (ref 20–33)
CREAT SERPL-MCNC: 0.83 MG/DL (ref 0.5–1.4)
EOSINOPHIL # BLD AUTO: 0.01 K/UL (ref 0–0.51)
EOSINOPHIL NFR BLD: 0.1 % (ref 0–6.9)
ERYTHROCYTE [DISTWIDTH] IN BLOOD BY AUTOMATED COUNT: 35.4 FL (ref 35.9–50)
GFR SERPLBLD CREATININE-BSD FMLA CKD-EPI: 96 ML/MIN/1.73 M 2
GLOBULIN SER CALC-MCNC: 3.2 G/DL (ref 1.9–3.5)
GLUCOSE SERPL-MCNC: 96 MG/DL (ref 65–99)
HCT VFR BLD AUTO: 46.5 % (ref 37–47)
HGB BLD-MCNC: 17.1 G/DL (ref 12–16)
IMM GRANULOCYTES # BLD AUTO: 0.13 K/UL (ref 0–0.11)
IMM GRANULOCYTES NFR BLD AUTO: 1 % (ref 0–0.9)
LIPASE SERPL-CCNC: 30 U/L (ref 7–58)
LYMPHOCYTES # BLD AUTO: 2.03 K/UL (ref 1–4.8)
LYMPHOCYTES NFR BLD: 15.7 % (ref 22–41)
MCH RBC QN AUTO: 31.4 PG (ref 27–33)
MCHC RBC AUTO-ENTMCNC: 36.8 G/DL (ref 33.6–35)
MCV RBC AUTO: 85.5 FL (ref 81.4–97.8)
MONOCYTES # BLD AUTO: 0.57 K/UL (ref 0–0.85)
MONOCYTES NFR BLD AUTO: 4.4 % (ref 0–13.4)
NEUTROPHILS # BLD AUTO: 10.15 K/UL (ref 2–7.15)
NEUTROPHILS NFR BLD: 78.5 % (ref 44–72)
NRBC # BLD AUTO: 0 K/UL
NRBC BLD-RTO: 0 /100 WBC
PLATELET # BLD AUTO: 375 K/UL (ref 164–446)
PMV BLD AUTO: 8.6 FL (ref 9–12.9)
POTASSIUM SERPL-SCNC: 3.4 MMOL/L (ref 3.6–5.5)
PROT SERPL-MCNC: 8 G/DL (ref 6–8.2)
RBC # BLD AUTO: 5.44 M/UL (ref 4.2–5.4)
SODIUM SERPL-SCNC: 136 MMOL/L (ref 135–145)
WBC # BLD AUTO: 12.9 K/UL (ref 4.8–10.8)

## 2022-09-01 PROCEDURE — 700111 HCHG RX REV CODE 636 W/ 250 OVERRIDE (IP): Performed by: EMERGENCY MEDICINE

## 2022-09-01 PROCEDURE — 96375 TX/PRO/DX INJ NEW DRUG ADDON: CPT

## 2022-09-01 PROCEDURE — 700105 HCHG RX REV CODE 258: Performed by: EMERGENCY MEDICINE

## 2022-09-01 PROCEDURE — 36415 COLL VENOUS BLD VENIPUNCTURE: CPT

## 2022-09-01 PROCEDURE — 80053 COMPREHEN METABOLIC PANEL: CPT

## 2022-09-01 PROCEDURE — 83690 ASSAY OF LIPASE: CPT

## 2022-09-01 PROCEDURE — 700102 HCHG RX REV CODE 250 W/ 637 OVERRIDE(OP): Performed by: EMERGENCY MEDICINE

## 2022-09-01 PROCEDURE — 96374 THER/PROPH/DIAG INJ IV PUSH: CPT

## 2022-09-01 PROCEDURE — 99284 EMERGENCY DEPT VISIT MOD MDM: CPT

## 2022-09-01 PROCEDURE — A9270 NON-COVERED ITEM OR SERVICE: HCPCS | Performed by: EMERGENCY MEDICINE

## 2022-09-01 PROCEDURE — 85025 COMPLETE CBC W/AUTO DIFF WBC: CPT

## 2022-09-01 RX ORDER — OMEPRAZOLE 20 MG/1
20 CAPSULE, DELAYED RELEASE ORAL DAILY
Qty: 14 CAPSULE | Refills: 0 | Status: SHIPPED | OUTPATIENT
Start: 2022-09-01 | End: 2022-09-15

## 2022-09-01 RX ORDER — ONDANSETRON 4 MG/1
4 TABLET, ORALLY DISINTEGRATING ORAL EVERY 6 HOURS PRN
Qty: 20 TABLET | Refills: 0 | Status: SHIPPED | OUTPATIENT
Start: 2022-09-01

## 2022-09-01 RX ORDER — ONDANSETRON 2 MG/ML
4 INJECTION INTRAMUSCULAR; INTRAVENOUS ONCE
Status: COMPLETED | OUTPATIENT
Start: 2022-09-01 | End: 2022-09-01

## 2022-09-01 RX ORDER — SODIUM CHLORIDE, SODIUM LACTATE, POTASSIUM CHLORIDE, CALCIUM CHLORIDE 600; 310; 30; 20 MG/100ML; MG/100ML; MG/100ML; MG/100ML
1000 INJECTION, SOLUTION INTRAVENOUS ONCE
Status: COMPLETED | OUTPATIENT
Start: 2022-09-01 | End: 2022-09-01

## 2022-09-01 RX ORDER — SUCRALFATE ORAL 1 G/10ML
1 SUSPENSION ORAL 4 TIMES DAILY
Qty: 560 ML | Refills: 0 | Status: SHIPPED | OUTPATIENT
Start: 2022-09-01 | End: 2022-09-15

## 2022-09-01 RX ADMIN — LIDOCAINE HYDROCHLORIDE 30 ML: 20 SOLUTION OROPHARYNGEAL at 16:47

## 2022-09-01 RX ADMIN — FAMOTIDINE 20 MG: 10 INJECTION INTRAVENOUS at 16:43

## 2022-09-01 RX ADMIN — ONDANSETRON 4 MG: 2 INJECTION INTRAMUSCULAR; INTRAVENOUS at 16:42

## 2022-09-01 RX ADMIN — SODIUM CHLORIDE, POTASSIUM CHLORIDE, SODIUM LACTATE AND CALCIUM CHLORIDE 1000 ML: 600; 310; 30; 20 INJECTION, SOLUTION INTRAVENOUS at 16:46

## 2022-09-01 ASSESSMENT — FIBROSIS 4 INDEX: FIB4 SCORE: 0.41

## 2022-09-01 NOTE — ED TRIAGE NOTES
Pt amb to triage c/o n/v >10d. Pt seen at [facility] and  ER  for same s/s in the past week. Pt states s/s not improving

## 2022-09-01 NOTE — ED NOTES
IV started with blood drawn and sent to lab.    Medicated as ordered and LR infusing as ordered.  No active nausea or vomiting.

## 2022-09-01 NOTE — ED PROVIDER NOTES
ED Provider Note    CHIEF COMPLAINT  Chief Complaint   Patient presents with    N/V       HPI  Ms. Deshpande is a 31-year-old female with a past medical history of migraines and intermittent GERD not currently on medications who presents emergency room for recurrent nausea vomiting and upper abdominal discomfort.  The patient has been seen here on several occurrences and was diagnosed with COVID yesterday with dehydration noted on her labs and patient was treated for these findings and was sent home after fluid resuscitation was complete.  Upon going home she was feeling somewhat worse with persistent nausea burning sensations in the upper abdomen that radiates to the lower part of the chest.  She has had this problem before and was referred to gastroenterology but did not follow-up as directed.  At this time she has been having some dry heaves, denies any bilious or bloody emesis, has not had any dark bowel movements, has not had any abdominal rigidity, flank pain or dysuria or hematuria.  She is currently denying any active chest discomfort, no shortness of breath    PPE Note: I personally donned full PPE for all patient encounters during this visit, including being clean-shaven with an N95 respirator mask, gloves, and goggles.     REVIEW OF SYSTEMS  See HPI for further details. All other systems are negative.     PAST MEDICAL HISTORY   has a past medical history of Headache, classical migraine (2006).    SOCIAL HISTORY  Social History     Tobacco Use    Smoking status: Never    Smokeless tobacco: Never   Substance and Sexual Activity    Alcohol use: No     Comment: very occasional     Drug use: Yes     Types: Inhaled     Comment: marijuana    Sexual activity: Yes     Birth control/protection: Pill       SURGICAL HISTORY  patient denies any surgical history    CURRENT MEDICATIONS  Home Medications    **Home medications have not yet been reviewed for this encounter**         ALLERGIES  No Known Allergies    PHYSICAL  "EXAM  VITAL SIGNS: BP (!) 141/106   Pulse 75   Temp 36 °C (96.8 °F) (Temporal)   Resp 16   Ht 1.702 m (5' 7\")   Wt 61.5 kg (135 lb 9.3 oz)   LMP  (LMP Unknown) Comment: On birth control pills  SpO2 99%   BMI 21.24 kg/m²   Pulse ox interpretation: I interpret this pulse ox as normal.  Genl: F sitting in chair comfortably, speaking clearly, appears tearful but in no acute distress   Head: NC/AT   ENT: Mucous membranes slightly dry, posterior pharynx clear, uvula midline, nares patent bilaterally   Eyes: Normal sclera, pupils equal round reactive to light  Neck: Supple, FROM, no LAD appreciated   Pulmonary: Lungs are clear to auscultation bilaterally  Chest: No TTP  CV:  RRR, no murmur appreciated, pulses 2+ in both upper and lower extremities,  Abdomen: soft, epigastric discomfort, no true Holbrook sign, no McBurney's point tenderness, no pain with bilateral straight leg raise.  ND; no rebound/guarding, no masses palpated, no HSM   : no CVA or suprapubic tenderness   Musculoskeletal: Pain free ROM of the neck. Moving upper and lower extremities and spontaneous in coordinated fashion  Neuro: A&Ox4 (person, place, time, situation), speech fluent, gait steady, no focal deficits appreciated    DIAGNOSTIC STUDIES / PROCEDURES    LABS  Labs Reviewed   CBC WITH DIFFERENTIAL - Abnormal; Notable for the following components:       Result Value    WBC 12.9 (*)     RBC 5.44 (*)     Hemoglobin 17.1 (*)     MCHC 36.8 (*)     RDW 35.4 (*)     MPV 8.6 (*)     Neutrophils-Polys 78.50 (*)     Lymphocytes 15.70 (*)     Immature Granulocytes 1.00 (*)     Neutrophils (Absolute) 10.15 (*)     Immature Granulocytes (abs) 0.13 (*)     All other components within normal limits   COMP METABOLIC PANEL - Abnormal; Notable for the following components:    Potassium 3.4 (*)     Co2 19 (*)     Anion Gap 18.0 (*)     All other components within normal limits   LIPASE   ESTIMATED GFR     RADIOLOGY  No orders to display     COURSE & MEDICAL " DECISION MAKING  Pertinent Labs & Imaging studies reviewed. (See chart for details)    DDX:  Viral syndrome, esophagitis, PUD, pancreatitis, gastritis, GERD, cholelithiasis, cholecystitis, choledocolithiasis    MDM  Initial evaluation at 1607:  Patient is seen and evaluated for symptoms as described above.  On initial assessment she is tearful, appears clinically dehydrated and has some slight hypertension but has also been reporting frequent dry heaves.  She was discharged with new diagnosis of COVID, dehydration had primarily gastrointestinal sources of her discomfort with no acute respiratory distress.  Remote history of asthma and migraines but has had no wheezing and is not tachypneic or hypoxic on initial assessment.  I do believe she likely has some aggravated sequela from underlying GERD or peptic ulcer disease from her recent COVID diagnosis and will be treated with fluid hydration and lab work obtained for the broad differential as noted above.    She does not have a clinical signs of progression to an acute surgical abdomen with no further localization beyond the epigastric region, again pointing towards a true gastro intestinal source.    Lab work shows findings consistent with recent vomiting episodes and dehydration.  Patient has a slight elevation in anion gap and slight decrease in bicarb with a normal glucose.  Additionally she has had repeat retching episodes and has a very faint leukocytosis with a leftward shift and no bandemia.  She has no LFT elevations, no evidence of hepatobiliary obstructive pathology or pancreatitis.  She is very fluid responsive, was given IV medications and eventually transition to oral medications.  With no persistent febrile illness and no known COVID diagnosis on the leukocytosis could come from this, think it is compounding her underlying gastrointestinal issues that the patient has had on an intermittent basis over many years.    Following fluid resuscitation and  medication administration, the patient is improved, vital signs are normalizing, at this time I would recommend that the patient be started on Carafate, PPI and Zofran.  Referral for outpatient GI should she have failure of this medication in the next 2 weeks also recommended and at this time the patient can be discharged home stable condition.    HYDRATION: Based on the patient's presentation of Acute Vomiting, Dehydration, and Inability to take oral fluids the patient was given IV fluids. IV Hydration was used because oral hydration was not adequate alone. Upon recheck following hydration, the patient was improved.    FINAL IMPRESSION  Visit Diagnoses     ICD-10-CM   1. Non-intractable vomiting with nausea, unspecified vomiting type  R11.2   2. Dehydration  E86.0   3. Epigastric pain  R10.13   4. COVID  U07.1        Electronically signed by: Reji Sykes M.D., 9/1/2022 4:08 PM

## 2022-09-02 NOTE — ED NOTES
PO liquids and yogurt provided  Pt feels better and brannon po  D/C instn reviewed F/U GI and PMD Return if worse Change med to carafate and zofran D/C ambul Stable improved condn

## 2022-09-02 NOTE — ED NOTES
ERP to re eval pt   Refill Authorization Note     is requesting a refill authorization.    Brief assessment and rationale for refill: DEFER: outside of protocol     Date of last appointment: 5/30/2018                    Medication Therapy Plan: NOV: 11/30/18 last e-scripted to [id number] on 7/16/18 for 30 day supply   Name and strength of medication: zolpidem (AMBIEN) 10 mg Tab        Comments:

## 2022-09-27 ENCOUNTER — HOSPITAL ENCOUNTER (OUTPATIENT)
Dept: RADIOLOGY | Facility: MEDICAL CENTER | Age: 32
End: 2022-09-27
Attending: NURSE PRACTITIONER
Payer: OTHER MISCELLANEOUS

## 2022-09-27 DIAGNOSIS — R10.10 UPPER ABDOMINAL PAIN: ICD-10-CM

## 2022-09-27 PROCEDURE — 76700 US EXAM ABDOM COMPLETE: CPT

## 2024-02-07 ENCOUNTER — HOSPITAL ENCOUNTER (OUTPATIENT)
Facility: MEDICAL CENTER | Age: 34
End: 2024-02-07
Payer: COMMERCIAL

## 2024-02-07 PROCEDURE — 88175 CYTOPATH C/V AUTO FLUID REDO: CPT

## 2024-02-07 PROCEDURE — 87624 HPV HI-RISK TYP POOLED RSLT: CPT

## 2024-02-07 PROCEDURE — 87625 HPV TYPES 16 & 18 ONLY: CPT

## 2024-02-13 LAB
CYTOLOGIST CVX/VAG CYTO: ABNORMAL
CYTOLOGY CVX/VAG DOC CYTO: ABNORMAL
CYTOLOGY CVX/VAG DOC THIN PREP: ABNORMAL
HPV I/H RISK 4 DNA CVX QL PROBE+SIG AMP: POSITIVE
HPV16 DNA CVX QL PROBE+SIG AMP: NEGATIVE
HPV18+45 E6+E7 MRNA CVX QL NAA+PROBE: POSITIVE
NOTE NL11727A: ABNORMAL
OTHER STN SPEC: ABNORMAL
STAT OF ADQ CVX/VAG CYTO-IMP: ABNORMAL

## 2024-11-28 NOTE — DISCHARGE INSTRUCTIONS
Warm compress to the area 3 times a day.  Your packing needs to be removed in 2 days.  Take the antibiotics as directed.  Any increased redness, pain, fevers, or unable to take your medications return.  
Private car